# Patient Record
Sex: MALE | Race: WHITE | NOT HISPANIC OR LATINO | Employment: FULL TIME | URBAN - NONMETROPOLITAN AREA
[De-identification: names, ages, dates, MRNs, and addresses within clinical notes are randomized per-mention and may not be internally consistent; named-entity substitution may affect disease eponyms.]

---

## 2017-01-14 DIAGNOSIS — R12 HEARTBURN: ICD-10-CM

## 2017-01-16 RX ORDER — PANTOPRAZOLE SODIUM 40 MG/1
TABLET, DELAYED RELEASE ORAL
Qty: 30 TABLET | Refills: 0 | OUTPATIENT
Start: 2017-01-16

## 2018-01-29 ENCOUNTER — TRANSCRIBE ORDERS (OUTPATIENT)
Dept: ULTRASOUND IMAGING | Facility: HOSPITAL | Age: 48
End: 2018-01-29

## 2018-01-29 DIAGNOSIS — E01.0 THYROMEGALY: Primary | ICD-10-CM

## 2021-01-27 PROCEDURE — U0004 COV-19 TEST NON-CDC HGH THRU: HCPCS | Performed by: PERSONAL EMERGENCY RESPONSE ATTENDANT

## 2021-05-20 ENCOUNTER — TRANSCRIBE ORDERS (OUTPATIENT)
Dept: GENERAL RADIOLOGY | Facility: HOSPITAL | Age: 51
End: 2021-05-20

## 2021-05-20 ENCOUNTER — HOSPITAL ENCOUNTER (OUTPATIENT)
Dept: CARDIOLOGY | Facility: HOSPITAL | Age: 51
Discharge: HOME | End: 2021-05-20

## 2021-05-20 ENCOUNTER — HOSPITAL ENCOUNTER (OUTPATIENT)
Dept: GENERAL RADIOLOGY | Facility: HOSPITAL | Age: 51
Discharge: HOME | End: 2021-05-20

## 2021-05-20 DIAGNOSIS — Z01.818 PRE-OP EXAM: ICD-10-CM

## 2021-05-20 DIAGNOSIS — Z01.818 PRE-OP EXAM: Primary | ICD-10-CM

## 2021-05-20 LAB
QT INTERVAL: 344 MS
QTC INTERVAL: 394 MS

## 2021-05-20 PROCEDURE — 71046 X-RAY EXAM CHEST 2 VIEWS: CPT

## 2021-05-20 PROCEDURE — 93005 ELECTROCARDIOGRAM TRACING: CPT | Performed by: NURSE PRACTITIONER

## 2021-05-20 PROCEDURE — 93010 ELECTROCARDIOGRAM REPORT: CPT | Performed by: INTERNAL MEDICINE

## 2021-06-25 ENCOUNTER — APPOINTMENT (OUTPATIENT)
Dept: PHARMACY | Facility: HOSPITAL | Age: 51
End: 2021-06-25

## 2021-07-23 ENCOUNTER — DISEASE STATE MANAGEMENT VISIT (OUTPATIENT)
Dept: PHARMACY | Facility: HOSPITAL | Age: 51
End: 2021-07-23

## 2021-07-23 ENCOUNTER — LAB (OUTPATIENT)
Dept: LAB | Facility: HOSPITAL | Age: 51
End: 2021-07-23

## 2021-07-23 VITALS
TEMPERATURE: 96.9 F | HEART RATE: 68 BPM | DIASTOLIC BLOOD PRESSURE: 96 MMHG | SYSTOLIC BLOOD PRESSURE: 150 MMHG | OXYGEN SATURATION: 92 %

## 2021-07-23 DIAGNOSIS — F19.91 HISTORY OF ILLICIT DRUG USE: ICD-10-CM

## 2021-07-23 DIAGNOSIS — F10.11 HISTORY OF ALCOHOL ABUSE: ICD-10-CM

## 2021-07-23 DIAGNOSIS — Z86.010 HISTORY OF COLON POLYPS: ICD-10-CM

## 2021-07-23 DIAGNOSIS — B18.2 CHRONIC HEPATITIS C WITHOUT HEPATIC COMA (HCC): ICD-10-CM

## 2021-07-23 DIAGNOSIS — K21.9 GASTROESOPHAGEAL REFLUX DISEASE, UNSPECIFIED WHETHER ESOPHAGITIS PRESENT: ICD-10-CM

## 2021-07-23 DIAGNOSIS — B18.2 CHRONIC HEPATITIS C WITHOUT HEPATIC COMA (HCC): Primary | ICD-10-CM

## 2021-07-23 LAB
ALBUMIN SERPL-MCNC: 4.4 G/DL (ref 3.5–5.2)
ALBUMIN/GLOB SERPL: 1.4 G/DL
ALP SERPL-CCNC: 93 U/L (ref 39–117)
ALT SERPL W P-5'-P-CCNC: 62 U/L (ref 1–41)
AMPHET+METHAMPHET UR QL: NEGATIVE
AMPHETAMINES UR QL: NEGATIVE
ANION GAP SERPL CALCULATED.3IONS-SCNC: 9.1 MMOL/L (ref 5–15)
AST SERPL-CCNC: 39 U/L (ref 1–40)
BARBITURATES UR QL SCN: NEGATIVE
BENZODIAZ UR QL SCN: NEGATIVE
BILIRUB SERPL-MCNC: 0.4 MG/DL (ref 0–1.2)
BUN SERPL-MCNC: 13 MG/DL (ref 6–20)
BUN/CREAT SERPL: 18.3 (ref 7–25)
BUPRENORPHINE SERPL-MCNC: POSITIVE NG/ML
CALCIUM SPEC-SCNC: 9.1 MG/DL (ref 8.6–10.5)
CANNABINOIDS SERPL QL: NEGATIVE
CHLORIDE SERPL-SCNC: 102 MMOL/L (ref 98–107)
CO2 SERPL-SCNC: 25.9 MMOL/L (ref 22–29)
COCAINE UR QL: NEGATIVE
CREAT SERPL-MCNC: 0.71 MG/DL (ref 0.76–1.27)
DEPRECATED RDW RBC AUTO: 39.7 FL (ref 37–54)
ERYTHROCYTE [DISTWIDTH] IN BLOOD BY AUTOMATED COUNT: 11.6 % (ref 12.3–15.4)
GFR SERPL CREATININE-BSD FRML MDRD: 118 ML/MIN/1.73
GLOBULIN UR ELPH-MCNC: 3.2 GM/DL
GLUCOSE SERPL-MCNC: 118 MG/DL (ref 65–99)
HBV SURFACE AG SERPL QL IA: NORMAL
HCT VFR BLD AUTO: 44.9 % (ref 37.5–51)
HGB BLD-MCNC: 15.5 G/DL (ref 13–17.7)
HIV1 P24 AG SER QL: NORMAL
HIV1+2 AB SER QL: NORMAL
INR PPP: 0.93 (ref 0.9–1.1)
MCH RBC QN AUTO: 32.6 PG (ref 26.6–33)
MCHC RBC AUTO-ENTMCNC: 34.5 G/DL (ref 31.5–35.7)
MCV RBC AUTO: 94.3 FL (ref 79–97)
METHADONE UR QL SCN: NEGATIVE
OPIATES UR QL: NEGATIVE
OXYCODONE UR QL SCN: NEGATIVE
PCP UR QL SCN: NEGATIVE
PLATELET # BLD AUTO: 248 10*3/MM3 (ref 140–450)
PMV BLD AUTO: 11.8 FL (ref 6–12)
POTASSIUM SERPL-SCNC: 4.6 MMOL/L (ref 3.5–5.2)
PROPOXYPH UR QL: NEGATIVE
PROT SERPL-MCNC: 7.6 G/DL (ref 6–8.5)
PROTHROMBIN TIME: 13 SECONDS (ref 12–15.1)
RBC # BLD AUTO: 4.76 10*6/MM3 (ref 4.14–5.8)
SODIUM SERPL-SCNC: 137 MMOL/L (ref 136–145)
TRICYCLICS UR QL SCN: NEGATIVE
WBC # BLD AUTO: 7.05 10*3/MM3 (ref 3.4–10.8)

## 2021-07-23 PROCEDURE — 86706 HEP B SURFACE ANTIBODY: CPT

## 2021-07-23 PROCEDURE — 87899 AGENT NOS ASSAY W/OPTIC: CPT

## 2021-07-23 PROCEDURE — 80053 COMPREHEN METABOLIC PANEL: CPT

## 2021-07-23 PROCEDURE — 87902 NFCT AGT GNTYP ALYS HEP C: CPT

## 2021-07-23 PROCEDURE — G0432 EIA HIV-1/HIV-2 SCREEN: HCPCS

## 2021-07-23 PROCEDURE — 86708 HEPATITIS A ANTIBODY: CPT

## 2021-07-23 PROCEDURE — 99244 OFF/OP CNSLTJ NEW/EST MOD 40: CPT | Performed by: PHYSICIAN ASSISTANT

## 2021-07-23 PROCEDURE — 87522 HEPATITIS C REVRS TRNSCRPJ: CPT

## 2021-07-23 PROCEDURE — 85610 PROTHROMBIN TIME: CPT

## 2021-07-23 PROCEDURE — 86704 HEP B CORE ANTIBODY TOTAL: CPT

## 2021-07-23 PROCEDURE — 36415 COLL VENOUS BLD VENIPUNCTURE: CPT

## 2021-07-23 PROCEDURE — 80306 DRUG TEST PRSMV INSTRMNT: CPT

## 2021-07-23 PROCEDURE — 81596 NFCT DS CHRNC HCV 6 ASSAYS: CPT

## 2021-07-23 PROCEDURE — 85027 COMPLETE CBC AUTOMATED: CPT

## 2021-07-23 PROCEDURE — 87340 HEPATITIS B SURFACE AG IA: CPT

## 2021-07-23 RX ORDER — OMEPRAZOLE 40 MG/1
40 CAPSULE, DELAYED RELEASE ORAL DAILY
COMMUNITY

## 2021-07-23 RX ORDER — ATORVASTATIN CALCIUM 40 MG/1
40 TABLET, FILM COATED ORAL DAILY
COMMUNITY

## 2021-07-23 RX ORDER — GABAPENTIN 600 MG/1
600 TABLET ORAL 3 TIMES DAILY
COMMUNITY

## 2021-07-23 RX ORDER — IBUPROFEN 800 MG/1
800 TABLET ORAL EVERY 8 HOURS PRN
COMMUNITY

## 2021-07-23 RX ORDER — METOPROLOL SUCCINATE 25 MG/1
12.5 TABLET, EXTENDED RELEASE ORAL DAILY
COMMUNITY

## 2021-07-23 RX ORDER — TIZANIDINE 4 MG/1
4 TABLET ORAL 2 TIMES DAILY PRN
COMMUNITY
End: 2021-12-21

## 2021-07-23 NOTE — PROGRESS NOTES
New Patient Consult      Date: 2021   Patient Name: Dusty Erickson  MRN: [id number]  : 1970     Primary Care Provider: Genie Loera APRN  Referring Provider: Evaristo    Chief Complaint   Patient presents with   • Hepatitis C     Pt here for initial Hep C visit     History of Present Illness: Dusty Erickson is a 50 y.o. male who is here today for consultation with Gastroenterology for evaluation of Hepatitis C.    He found out about having Hepatitis C infection approx 12 years ago. He has not had prior treatment for hepatitis. He was evaluated by  approx 6 years ago and was told his insurance would not cover it. Reports no known personal history of liver disease including other viral hepatitis. There is no known family history of liver disease or cirrhosis. His father was an alcoholic. He admits to previous IVDU and intranasal drug use. He does have nonprofessional tattoos. Has history of incarceration. Admits to having previous alcoholism, drank daily for approx 25 years. He stopped drinking about 5 years ago. He does not currently drink alcohol. He denies current illicit drug use including marijuana. No recent liver imaging. He has had recent labs with PCP. He has not had previous vaccinations for Hepatitis A and B, he thinks he started but did not finish these in the past. He is currently disabled with his back, planning for surgery soon.    He has had previous colonoscopy. There is no known family history of colon cancer or colon polyps. In  he had colonoscopy and had 1 hyperplastic colon polyp removed. He takes omeprazole 40 mg once daily and still has heartburn at times.     Subjective      Past Medical History:   Diagnosis Date   • GERD (gastroesophageal reflux disease)    • Hypertension      Past Surgical History:   Procedure Laterality Date   • CERVICAL FUSION       Family History   Problem Relation Age of Onset   • Irregular heart beat Mother    • Alcohol  abuse Father      Social History     Socioeconomic History   • Marital status: Single     Spouse name: Not on file   • Number of children: Not on file   • Years of education: Not on file   • Highest education level: Not on file   Tobacco Use   • Smoking status: Current Every Day Smoker     Packs/day: 0.75     Types: Cigarettes   • Smokeless tobacco: Never Used   Vaping Use   • Vaping Use: Some days   • Substances: Nicotine   • Devices: Disposable, Pre-filled or refillable cartridge   Substance and Sexual Activity   • Alcohol use: Not Currently   • Drug use: Not Currently   • Sexual activity: Defer       Current Outpatient Medications:   •  atorvastatin (LIPITOR) 40 MG tablet, Take 40 mg by mouth Daily., Disp: , Rfl:   •  gabapentin (NEURONTIN) 600 MG tablet, Take 600 mg by mouth 3 (Three) Times a Day., Disp: , Rfl:   •  ibuprofen (ADVIL,MOTRIN) 800 MG tablet, Take 800 mg by mouth Every 8 (Eight) Hours As Needed for Mild Pain ., Disp: , Rfl:   •  metoprolol succinate XL (TOPROL-XL) 25 MG 24 hr tablet, Take 12.5 mg by mouth Daily., Disp: , Rfl:   •  omeprazole (priLOSEC) 40 MG capsule, Take 40 mg by mouth Daily., Disp: , Rfl:   •  tiZANidine (ZANAFLEX) 4 MG tablet, Take 4 mg by mouth 2 (Two) Times a Day As Needed for Muscle Spasms., Disp: , Rfl:   •  clindamycin (CLEOCIN) 300 MG capsule, Take 1 capsule by mouth 3 (Three) Times a Day., Disp: 15 capsule, Rfl: 0  •  Diclofenac Sodium (VOLTAREN) 1 % gel gel, Apply 4 g topically to the appropriate area as directed 4 (Four) Times a Day As Needed (musculoskeletal pain)., Disp: 100 g, Rfl: 0  •  mupirocin (BACTROBAN) 2 % nasal ointment, into the nostril(s) as directed by provider 2 (Two) Times a Day., Disp: 1 g, Rfl: 0  •  sulfamethoxazole-trimethoprim (BACTRIM DS,SEPTRA DS) 800-160 MG per tablet, Take 1 tablet by mouth 2 (Two) Times a Day., Disp: 20 tablet, Rfl: 0    No Known Allergies    The following portions of the patient's history were reviewed and updated as  appropriate: allergies, current medications, past family history, past medical history, past social history, past surgical history and problem list.    Objective     Physical Exam  Vitals reviewed.   Constitutional:       General: He is in acute distress.      Appearance: Normal appearance. He is well-developed. He is not ill-appearing or diaphoretic.      Comments: Stands throughout visit   HENT:      Head: Normocephalic and atraumatic.      Right Ear: External ear normal.      Left Ear: External ear normal.      Nose: Nose normal.      Mouth/Throat:      Comments: Wearing a mask  Eyes:      General: No scleral icterus.        Right eye: No discharge.         Left eye: No discharge.      Conjunctiva/sclera: Conjunctivae normal.   Neck:      Vascular: No JVD.   Cardiovascular:      Rate and Rhythm: Normal rate and regular rhythm.      Heart sounds: Normal heart sounds. No murmur heard.   No friction rub. No gallop.    Pulmonary:      Effort: No respiratory distress.      Breath sounds: Wheezing present. No rales.      Comments: Mild increased WOB  Chest:      Chest wall: No tenderness.   Abdominal:      General: Bowel sounds are normal. There is no distension.      Palpations: Abdomen is soft. There is no mass.      Tenderness: There is no abdominal tenderness. There is no guarding.   Musculoskeletal:         General: No deformity.      Cervical back: Normal range of motion.      Comments: Pain with ROM spine and difficulty getting on exam table   Skin:     General: Skin is warm and dry.      Findings: No erythema or rash.      Comments: tattoos   Neurological:      Mental Status: He is alert and oriented to person, place, and time.      Coordination: Coordination normal.   Psychiatric:         Mood and Affect: Mood normal.         Behavior: Behavior normal.         Thought Content: Thought content normal.         Judgment: Judgment normal.       Vitals:    07/23/21 0900   BP: 150/96   Patient Position: Standing    Pulse: 68   Temp: 96.9 °F (36.1 °C)   SpO2: 92%     Results Review:   I have reviewed the patient's new clinical and imaging results.     XR Chest PA & Lateral  Result Date: 5/20/2021  No acute cardiopulmonary process.       Labs labs 4/27/2021: Creatinine 0.67, , albumin 4.7, bilirubin 0.6, alkaline phosphatase 75, AST 31, ALT 48, total cholesterol 217, triglycerides 106, LDL cholesterol 153, TSH 1.7, hemoglobin 15.3, hematocrit 47.0, white blood count 6.6, .8, platelets 285,000.    Assessment / Plan      1. Chronic hepatitis C without hepatic coma (CMS/HCC)  07/23/21  He has chronic hepatitis C infection, genotype unknown, treatment naive, without suspected cirrhosis. He will need labs for further evaluation of chronic hepatitis C infection including CBC, CMP, HCV RNA quant, genotype, HIV screening, hepatitis B screening, labs to determine immunity to Hepatitis A and B, INR.    More recommendations will be made after these results have been reviewed. He will continue to abstain from alcohol and illegal drugs. He will have US liver to determine if any lesions or other liver diseases present. Immunity to Hep A and B will be determined and vaccinations recommended if needed. After review of these results and discussion with with the patient, we will proceed with Hep C therapy and will submit Rx to insurance company for approval. Treatment will depend on fibrosis score and Hep C genotype. When approved, he will  Rx from our pharmacy and start taking exactly as directed. Hep C viral load lab (HCV RNA quant) and CMP will be checked 4 weeks after start of therapy, at end of therapy and 3 months s/p therapy to determine response to treatment and cure. He will call with concerns.     - CBC (No Diff); Future  - Comprehensive Metabolic Panel; Future  - HCV FibroSURE; Future  - HCV RNA By PCR, Qn Rfx Elma; Future  - Hepatitis A Antibody, Total; Future  - Hepatitis B Core Antibody, Total; Future  -  Hepatitis B Surface Antibody; Future  - Hepatitis B Surface Antigen; Future  - HIV-1 / O / 2 Ag / Antibody 4th Generation; Future  - Protime-INR; Future  - US Liver; Future    2. History of illicit drug use  07/23/21  He understands that even after treatment of Hepatitis C, he is not immune to contract hepatitis C again if he continues any risky behaviors. He has been clean from any illicit drug use for several years now. UDS will be completed today.  - Urine Drug Screen - Urine, Clean Catch; Future    3. History of alcohol abuse  07/23/21  He drank alcohol daily in large amounts for 25 years, stopped drinking approx 5 years ago. He may have advanced fibrosis with combination of alcoholism and Hep C history.     4. Gastroesophageal reflux disease, unspecified whether esophagitis present  07/23/21  He is taking omeprazole 40 mg once daily now without complete control of GERD complaints. He does admit that he is not adherent to a GERD diet. He was instructed not to lie down immediately after eating (wait at least 3 hours after meals), elevate the head of the bed at night, avoid spicy foods, avoid mints, avoid caffeine, avoid nicotine and work on getting to a healthy weight. Continue daily PPI for now. May need EGD in the future if severe symptoms persist.     5. History of colon polyps  07/23/21  His last colonoscopy was in 2015 by Dr. Schilling and he had 1 small hyperplastic colon polyp removed. Next colonoscopy for surveillance recommended in 10 years, due in 2025.             Follow Up:   Return in about 2 weeks (around 8/6/2021) for discussion of results. He is having back surgery soon, so he may follow up later.      Taryn Vitale PA-C  [facility]  7/23/2021  16:29 EDT    Please note that portions of this note may have been completed with a voice recognition program. Efforts were made to edit the dictations, but occasionally words are mistranscribed.

## 2021-07-24 LAB
HAV AB SER QL IA: POSITIVE
HBV CORE AB SERPL QL IA: POSITIVE

## 2021-07-26 LAB — HBV SURFACE AB SER RIA-ACNC: REACTIVE

## 2021-07-27 LAB
A2 MACROGLOB SERPL-MCNC: 444 MG/DL (ref 110–276)
ALT SERPL W P-5'-P-CCNC: 73 IU/L (ref 0–55)
APO A-I SERPL-MCNC: 147 MG/DL (ref 101–178)
BILIRUB SERPL-MCNC: 0.4 MG/DL (ref 0–1.2)
FIBROSIS SCORING:: ABNORMAL
FIBROSIS STAGE SERPL QL: ABNORMAL
GGT SERPL-CCNC: 93 IU/L (ref 0–65)
HAPTOGLOB SERPL-MCNC: 116 MG/DL (ref 23–355)
HCV AB SER QL: ABNORMAL
LABORATORY COMMENT REPORT: ABNORMAL
LIVER FIBR SCORE SERPL CALC.FIBROSURE: 0.66 (ref 0–0.21)
NECROINFLAMM ACTIVITY SCORING:: ABNORMAL
NECROINFLAMMATORY ACT GRADE SERPL QL: ABNORMAL
NECROINFLAMMATORY ACT SCORE SERPL: 0.57 (ref 0–0.17)
SERVICE CMNT-IMP: ABNORMAL

## 2021-07-28 LAB
HCV GENTYP SERPL NAA+PROBE: NORMAL
HCV GENTYP SERPL NAA+PROBE: NORMAL
HCV RNA SERPL NAA+PROBE-ACNC: NORMAL IU/ML
HCV RNA SERPL NAA+PROBE-LOG IU: 6.42 LOG10 IU/ML
LABORATORY COMMENT REPORT: NORMAL
REF LAB TEST REF RANGE: NORMAL

## 2021-08-10 ENCOUNTER — HOSPITAL ENCOUNTER (OUTPATIENT)
Dept: ULTRASOUND IMAGING | Facility: HOSPITAL | Age: 51
Discharge: HOME | End: 2021-08-10
Admitting: PHYSICIAN ASSISTANT

## 2021-08-10 DIAGNOSIS — B18.2 CHRONIC HEPATITIS C WITHOUT HEPATIC COMA (HCC): ICD-10-CM

## 2021-08-10 PROCEDURE — 76705 ECHO EXAM OF ABDOMEN: CPT

## 2021-08-20 ENCOUNTER — DISEASE STATE MANAGEMENT VISIT (OUTPATIENT)
Dept: PHARMACY | Facility: HOSPITAL | Age: 51
End: 2021-08-20

## 2021-08-20 ENCOUNTER — TELEPHONE (OUTPATIENT)
Dept: GASTROENTEROLOGY | Facility: CLINIC | Age: 51
End: 2021-08-20

## 2021-08-20 VITALS
OXYGEN SATURATION: 97 % | SYSTOLIC BLOOD PRESSURE: 121 MMHG | DIASTOLIC BLOOD PRESSURE: 80 MMHG | TEMPERATURE: 97.3 F | HEART RATE: 72 BPM

## 2021-08-20 DIAGNOSIS — B18.2 CHRONIC HEPATITIS C WITHOUT HEPATIC COMA (HCC): Primary | ICD-10-CM

## 2021-08-20 DIAGNOSIS — F19.91 HISTORY OF ILLICIT DRUG USE: ICD-10-CM

## 2021-08-20 DIAGNOSIS — K70.0 ALCOHOLIC FATTY LIVER: ICD-10-CM

## 2021-08-20 PROCEDURE — 99214 OFFICE O/P EST MOD 30 MIN: CPT | Performed by: PHYSICIAN ASSISTANT

## 2021-08-20 RX ORDER — BUPRENORPHINE HYDROCHLORIDE AND NALOXONE HYDROCHLORIDE DIHYDRATE 8; 2 MG/1; MG/1
2 TABLET SUBLINGUAL DAILY
COMMUNITY
Start: 2021-07-28

## 2021-08-20 RX ORDER — OXYCODONE HYDROCHLORIDE AND ACETAMINOPHEN 5; 325 MG/1; MG/1
1 TABLET ORAL EVERY 4 HOURS PRN
COMMUNITY
Start: 2021-08-19 | End: 2021-12-21

## 2021-08-20 RX ORDER — GLECAPREVIR AND PIBRENTASVIR 40; 100 MG/1; MG/1
3 TABLET, FILM COATED ORAL DAILY
Qty: 84 TABLET | Refills: 1
Start: 2021-08-20 | End: 2021-08-26

## 2021-08-20 NOTE — PROGRESS NOTES
Follow Up Note     Date: 2021   Patient Name: Dusty Erickson  MRN: [id number]  : 1970     Primary Care Provider: Genie Loera APRN     Chief Complaint   Patient presents with   • Hepatitis C     Pt here for 2 week follow up on treatment     History of present illness:   2021  Dusty Erickson is a 51 y.o. male who is here today for follow up regarding Hepatitis C.    He completed labs and ultrasound as directed last visit and would like to discuss those results. He had back surgery 3 weeks ago and reports recovering well. He does have recent new diagnosis of elevated cholesterol. He would like to be considered for hepatitis C treatment.     Interval History:  2021  He found out about having Hepatitis C infection approx 12 years ago. He has not had prior treatment for hepatitis. He was evaluated by  approx 6 years ago and was told his insurance would not cover it. Reports no known personal history of liver disease including other viral hepatitis. There is no known family history of liver disease or cirrhosis. His father was an alcoholic. He admits to previous IVDU and intranasal drug use. He does have nonprofessional tattoos. Has history of incarceration. Admits to having previous alcoholism, drank daily for approx 25 years. He stopped drinking about 5 years ago. He does not currently drink alcohol. He denies current illicit drug use including marijuana. No recent liver imaging. He has had recent labs with PCP. He has not had previous vaccinations for Hepatitis A and B, he thinks he started but did not finish these in the past. He is currently disabled with his back, planning for surgery soon.     He has had previous colonoscopy. There is no known family history of colon cancer or colon polyps. In  he had colonoscopy and had 1 hyperplastic colon polyp removed. He takes omeprazole 40 mg once daily and still has heartburn at times.     Subjective     Past Medical  History:   Diagnosis Date   • GERD (gastroesophageal reflux disease)    • Hypertension      Past Surgical History:   Procedure Laterality Date   • CERVICAL FUSION     • LUMBAR FUSION  08/04/2021     Family History   Problem Relation Age of Onset   • Irregular heart beat Mother    • Alcohol abuse Father      Social History     Socioeconomic History   • Marital status: Single     Spouse name: Not on file   • Number of children: Not on file   • Years of education: Not on file   • Highest education level: Not on file   Tobacco Use   • Smoking status: Current Every Day Smoker     Types: Cigarettes   • Smokeless tobacco: Never Used   • Tobacco comment: 3 per day   Vaping Use   • Vaping Use: Former   • Substances: Nicotine   • Devices: Disposable, Pre-filled or refillable cartridge   Substance and Sexual Activity   • Alcohol use: Not Currently   • Drug use: Not Currently   • Sexual activity: Defer     Current Outpatient Medications:   •  atorvastatin (LIPITOR) 40 MG tablet, Take 40 mg by mouth Daily., Disp: , Rfl:   •  Diclofenac Sodium (VOLTAREN) 1 % gel gel, Apply 4 g topically to the appropriate area as directed 4 (Four) Times a Day As Needed (musculoskeletal pain)., Disp: 100 g, Rfl: 0  •  gabapentin (NEURONTIN) 600 MG tablet, Take 600 mg by mouth 3 (Three) Times a Day., Disp: , Rfl:   •  ibuprofen (ADVIL,MOTRIN) 800 MG tablet, Take 800 mg by mouth Every 8 (Eight) Hours As Needed for Mild Pain ., Disp: , Rfl:   •  metoprolol succinate XL (TOPROL-XL) 25 MG 24 hr tablet, Take 12.5 mg by mouth Daily., Disp: , Rfl:   •  omeprazole (priLOSEC) 40 MG capsule, Take 40 mg by mouth Daily., Disp: , Rfl:   •  oxyCODONE-acetaminophen (PERCOCET) 5-325 MG per tablet, Take 1 tablet by mouth Every 4 (Four) Hours As Needed., Disp: , Rfl:   •  tiZANidine (ZANAFLEX) 4 MG tablet, Take 4 mg by mouth 2 (Two) Times a Day As Needed for Muscle Spasms., Disp: , Rfl:   •  buprenorphine-naloxone (SUBOXONE) 8-2 MG per SL tablet, Place 2 tablets  under the tongue Daily., Disp: , Rfl:     No Known Allergies    The following portions of the patient's history were reviewed and updated as appropriate: allergies, current medications, past family history, past medical history, past social history, past surgical history and problem list.    Objective     Physical Exam  Constitutional:       General: He is not in acute distress.     Appearance: Normal appearance. He is well-developed. He is not diaphoretic.   HENT:      Head: Normocephalic and atraumatic.      Right Ear: External ear normal.      Left Ear: External ear normal.      Nose: Nose normal.      Mouth/Throat:      Comments: Wearing a mask  Eyes:      General: No scleral icterus.        Right eye: No discharge.         Left eye: No discharge.      Conjunctiva/sclera: Conjunctivae normal.   Neck:      Trachea: No tracheal deviation.   Pulmonary:      Effort: Pulmonary effort is normal. No respiratory distress.   Musculoskeletal:         General: Normal range of motion.      Cervical back: Normal range of motion.   Skin:     Coloration: Skin is not pale.      Findings: No erythema or rash.   Neurological:      Mental Status: He is alert and oriented to person, place, and time.      Coordination: Coordination normal.   Psychiatric:         Mood and Affect: Mood normal.         Behavior: Behavior normal.         Thought Content: Thought content normal.         Judgment: Judgment normal.       Vitals:    08/20/21 0900   BP: 121/80   Pulse: 72   Temp: 97.3 °F (36.3 °C)   SpO2: 97%       Results Review:   I reviewed the patient's new clinical results.    Lab on 07/23/2021   Component Date Value Ref Range Status   • Protime 07/23/2021 13.0  12.0 - 15.1 Seconds Final   • INR 07/23/2021 0.93  0.90 - 1.10 Final   • WBC 07/23/2021 7.05  3.40 - 10.80 10*3/mm3 Final   • RBC 07/23/2021 4.76  4.14 - 5.80 10*6/mm3 Final   • Hemoglobin 07/23/2021 15.5  13.0 - 17.7 g/dL Final   • Hematocrit 07/23/2021 44.9  37.5 - 51.0 %  Final   • MCV 07/23/2021 94.3  79.0 - 97.0 fL Final   • MCH 07/23/2021 32.6  26.6 - 33.0 pg Final   • MCHC 07/23/2021 34.5  31.5 - 35.7 g/dL Final   • RDW 07/23/2021 11.6* 12.3 - 15.4 % Final   • RDW-SD 07/23/2021 39.7  37.0 - 54.0 fl Final   • MPV 07/23/2021 11.8  6.0 - 12.0 fL Final   • Platelets 07/23/2021 248  140 - 450 10*3/mm3 Final   • Glucose 07/23/2021 118* 65 - 99 mg/dL Final   • BUN 07/23/2021 13  6 - 20 mg/dL Final   • Creatinine 07/23/2021 0.71* 0.76 - 1.27 mg/dL Final   • Sodium 07/23/2021 137  136 - 145 mmol/L Final   • Potassium 07/23/2021 4.6  3.5 - 5.2 mmol/L Final   • Chloride 07/23/2021 102  98 - 107 mmol/L Final   • CO2 07/23/2021 25.9  22.0 - 29.0 mmol/L Final   • Calcium 07/23/2021 9.1  8.6 - 10.5 mg/dL Final   • Total Protein 07/23/2021 7.6  6.0 - 8.5 g/dL Final   • Albumin 07/23/2021 4.40  3.50 - 5.20 g/dL Final   • ALT (SGPT) 07/23/2021 62* 1 - 41 U/L Final   • AST (SGOT) 07/23/2021 39  1 - 40 U/L Final   • Alkaline Phosphatase 07/23/2021 93  39 - 117 U/L Final   • Total Bilirubin 07/23/2021 0.4  0.0 - 1.2 mg/dL Final   • eGFR Non  Amer 07/23/2021 118  >60 mL/min/1.73 Final   • Globulin 07/23/2021 3.2  gm/dL Final   • A/G Ratio 07/23/2021 1.4  g/dL Final   • BUN/Creatinine Ratio 07/23/2021 18.3  7.0 - 25.0 Final   • Anion Gap 07/23/2021 9.1  5.0 - 15.0 mmol/L Final   • Fibrosis Score 07/23/2021 0.66* 0.00 - 0.21 Final   • Fibrosis Stage 07/23/2021 Comment   Final     F3-Bridging fibrosis with many septa   • Necroinflammat Activity Score 07/23/2021 0.57* 0.00 - 0.17 Final   • Necroinflammat Activity Grade 07/23/2021 A2-Moderate activity   Final   • Alpha 2-Macroglobulins, Qn 07/23/2021 444* 110 - 276 mg/dL Final   • Haptoglobin 07/23/2021 116  23 - 355 mg/dL Final   • Apolipoprotein A-1 07/23/2021 147  101 - 178 mg/dL Final   • Total Bilirubin 07/23/2021 0.4  0.0 - 1.2 mg/dL Final   • GGT 07/23/2021 93* 0 - 65 IU/L Final   • ALT (SGPT) 07/23/2021 73* 0 - 55 IU/L Final   • Hepatitis C  Quantitation 07/23/2021 [id number]  IU/mL Final   • HCV log10 07/23/2021 6.415  log10 IU/mL Final   • Hep A Total Ab 07/23/2021 Positive* Negative Final   • Hep B Core Total Ab 07/23/2021 Positive* Negative Final   • Hep B S Ab 07/23/2021 Reactive* Non-Reactive Final   • Hepatitis B Surface Ag 07/23/2021 Non-Reactive  Non-Reactive Final   • HIV-1/ HIV-2 Ab 07/23/2021 Non-Reactive  Non-Reactive Final   • HIV-1 P24 Ag Screen 07/23/2021 Non-Reactive  Non-Reactive Final   • THC, Screen, Urine 07/23/2021 Negative  Negative Final   • Phencyclidine (PCP), Urine 07/23/2021 Negative  Negative Final   • Cocaine Screen, Urine 07/23/2021 Negative  Negative Final   • Methamphetamine, Ur 07/23/2021 Negative  Negative Final   • Opiate Screen 07/23/2021 Negative  Negative Final   • Amphetamine Screen, Urine 07/23/2021 Negative  Negative Final   • Benzodiazepine Screen, Urine 07/23/2021 Negative  Negative Final   • Tricyclic Antidepressants Screen 07/23/2021 Negative  Negative Final   • Methadone Screen, Urine 07/23/2021 Negative  Negative Final   • Barbiturates Screen, Urine 07/23/2021 Negative  Negative Final   • Oxycodone Screen, Urine 07/23/2021 Negative  Negative Final   • Propoxyphene Screen 07/23/2021 Negative  Negative Final   • Buprenorphine, Screen, Urine 07/23/2021 Positive* Negative Final   • Hepatitis C Genotype 07/23/2021 1a   Final      US Liver  Result Date: 8/10/2021  Fatty infiltration of the liver.      Assessment / Plan      1. Chronic hepatitis C without hepatic coma   08/20/21  He has chronic hepatitis C infection, genotype 1a, treatment naïve, without cirrhosis. I have perscribed Mavyret for 8 weeks for Hepatitis C therapy. He will take this medication at the same time every day without missing any doses. We had a discussion on treatment course, possible medication adverse reactions and follow-up during the treatment and after treatment. Hep C viral load lab (HCV RNA quant) and CMP will be checked 4 weeks after  start of therapy, at end of therapy and 3 months s/p therapy to determine response to treatment and cure. He will continue to abstain from alcohol use at this time and agrees not to use illegal drugs. He understands to avoid any high risk behavior to prevent any reinfection during treatment and after treatment.    Rx- Glecaprevir-Pibrentasvir (Mavyret) 100-40 MG tablet, Take 3 tablets by mouth Daily for 8 weeks. Disp: 84 tablet, Rfl: 1    CTP class A. No cirrhosis, fibrosis score calculated at F3, Genotype 1a.   He is immune to hepatitis A and B and no vaccinations are needed.   Source of infection is likely his previous IV drug use.  Liver imaging completed recently and showed fatty infection of the liver, no focal lesions.  HIV test is negative. No history of liver transplant.   Follow-up in 4 weeks time for discussion regarding medication adherence and will have labs after next visit.    7/23/2021  He has chronic hepatitis C infection, genotype unknown, treatment naive, without suspected cirrhosis. He will need labs for further evaluation of chronic hepatitis C infection including CBC, CMP, HCV RNA quant, genotype, HIV screening, hepatitis B screening, labs to determine immunity to Hepatitis A and B, INR. More recommendations will be made after these results have been reviewed. He will continue to abstain from alcohol and illegal drugs. He will have US liver to determine if any lesions or other liver diseases present. Immunity to Hep A and B will be determined and vaccinations recommended if needed. After review of these results and discussion with with the patient, we will proceed with Hep C therapy and will submit Rx to insurance company for approval. Treatment will depend on fibrosis score and Hep C genotype. When approved, he will  Rx from our pharmacy and start taking exactly as directed. Hep C viral load lab (HCV RNA quant) and CMP will be checked 4 weeks after start of therapy, at end of therapy and  3 months s/p therapy to determine response to treatment and cure. He will call with concerns.     2. Alcoholic fatty liver  08/20/21  Is a history of alcoholism, stopped drinking approximately 5 years ago but drank for more than 25 years daily prior to that.  He also has a new diagnosis of high cholesterol which is being treated by his PCP.  Mediterranean diet has been suggested and information given.  Last labs show ALT increased at 62 but normal total bilirubin.  Fibrosis stage was calculated at F3 on fibroSURE.  He will need long-term monitoring of his fatty liver disease.    3. History of illicit drug use  08/20/21  Recent urine drug screen showed Suboxone only which is prescribed.  He has not been using any illicit drugs or drinking alcohol.    07/23/21  He understands that even after treatment of Hepatitis C, he is not immune to contract hepatitis C again if he continues any risky behaviors. He has been clean from any illicit drug use for several years now. UDS will be completed today.         Prior history:  History of alcohol abuse  07/23/21  He drank alcohol daily in large amounts for 25 years, stopped drinking approx 5 years ago. He may have advanced fibrosis with combination of alcoholism and Hep C history.      Gastroesophageal reflux disease, unspecified whether esophagitis present  07/23/21  He is taking omeprazole 40 mg once daily now without complete control of GERD complaints. He does admit that he is not adherent to a GERD diet. He was instructed not to lie down immediately after eating (wait at least 3 hours after meals), elevate the head of the bed at night, avoid spicy foods, avoid mints, avoid caffeine, avoid nicotine and work on getting to a healthy weight. Continue daily PPI for now. May need EGD in the future if severe symptoms persist.      History of colon polyps  07/23/21  His last colonoscopy was in 2015 by Dr. Schilling and he had 1 small hyperplastic colon polyp removed. Next colonoscopy  for surveillance recommended in 10 years, due in 2025.       Follow Up:   Return in about 1 month (around 9/20/2021) for recheck Hepatitis C.      Taryn Vitale PA-C  [facility]  8/20/2021  12:19 EDT    Please note that portions of this note may have been completed with a voice recognition program. Efforts were made to edit the dictations, but occasionally words are mistranscribed.

## 2021-08-20 NOTE — TELEPHONE ENCOUNTER
He has chronic Hepatitis C infection, genotype 1a, treatment naive, without cirrhosis (F3). I have prescribed Mavyret 3 tabs PO once daily for 8 weeks for treatment.

## 2021-08-20 NOTE — PATIENT INSTRUCTIONS
Mediterranean Diet  A Mediterranean diet refers to food and lifestyle choices that are based on the traditions of countries located on the [place]. This way of eating has been shown to help prevent certain conditions and improve outcomes for people who have chronic diseases, like kidney disease and heart disease.  What are tips for following this plan?  Lifestyle  · Cook and eat meals together with your family, when possible.  · Drink enough fluid to keep your urine clear or pale yellow.  · Be physically active every day. This includes:  ? Aerobic exercise like running or swimming.  ? Leisure activities like gardening, walking, or housework.  · Get 7-8 hours of sleep each night.  · If recommended by your health care provider, drink red wine in moderation. This means 1 glass a day for nonpregnant women and 2 glasses a day for men. A glass of wine equals 5 oz (150 mL).  Reading food labels    · Check the serving size of packaged foods. For foods such as rice and pasta, the serving size refers to the amount of cooked product, not dry.  · Check the total fat in packaged foods. Avoid foods that have saturated fat or trans fats.  · Check the ingredients list for added sugars, such as corn syrup.  Shopping  · At the grocery store, buy most of your food from the areas near the walls of the store. This includes:  ? Fresh fruits and vegetables (produce).  ? Grains, beans, nuts, and seeds. Some of these may be available in unpackaged forms or large amounts (in bulk).  ? Fresh seafood.  ? Poultry and eggs.  ? Low-fat dairy products.  · Buy whole ingredients instead of prepackaged foods.  · Buy fresh fruits and vegetables in-season from local farmers markets.  · Buy frozen fruits and vegetables in resealable bags.  · If you do not have access to quality fresh seafood, buy precooked frozen shrimp or canned fish, such as tuna, salmon, or sardines.  · Buy small amounts of raw or cooked vegetables, salads, or olives from  the deli or salad bar at your store.  · Stock your pantry so you always have certain foods on hand, such as olive oil, canned tuna, canned tomatoes, rice, pasta, and beans.  Cooking  · Cook foods with extra-virgin olive oil instead of using butter or other vegetable oils.  · Have meat as a side dish, and have vegetables or grains as your main dish. This means having meat in small portions or adding small amounts of meat to foods like pasta or stew.  · Use beans or vegetables instead of meat in common dishes like chili or lasagna.  · [place] with different cooking methods. Try roasting or broiling vegetables instead of steaming or sautéeing them.  · Add frozen vegetables to soups, stews, pasta, or rice.  · Add nuts or seeds for added healthy fat at each meal. You can add these to yogurt, salads, or vegetable dishes.  · Marinate fish or vegetables using olive oil, lemon juice, garlic, and fresh herbs.  Meal planning    · Plan to eat 1 vegetarian meal one day each week. Try to work up to 2 vegetarian meals, if possible.  · Eat seafood 2 or more times a week.  · Have healthy snacks readily available, such as:  ? Vegetable sticks with hummus.  ? Greek yogurt.  ? Fruit and nut trail mix.  · Eat balanced meals throughout the week. This includes:  ? Fruit: 2-3 servings a day  ? Vegetables: 4-5 servings a day  ? Low-fat dairy: 2 servings a day  ? Fish, poultry, or lean meat: 1 serving a day  ? Beans and legumes: 2 or more servings a week  ? Nuts and seeds: 1-2 servings a day  ? Whole grains: 6-8 servings a day  ? Extra-virgin olive oil: 3-4 servings a day  · Limit red meat and sweets to only a few servings a month  What are my food choices?  · Mediterranean diet  ? Recommended  § Grains: Whole-grain pasta. Brown rice. Bulgar wheat. Polenta. Couscous. Whole-wheat bread. Oatmeal. Quinoa.  § Vegetables: Artichokes. Beets. Broccoli. Cabbage. Carrots. Eggplant. Green beans. Chard. Kale. Spinach. Onions. Leeks. Peas. Squash.  Tomatoes. Peppers. Radishes.  § Fruits: Apples. Apricots. Avocado. Berries. Bananas. Cherries. Dates. Figs. Grapes. [place]. Melon. Oranges. Peaches. Plums. Pomegranate.  § Meats and other protein foods: Beans. Almonds. Sunflower seeds. Pine nuts. Peanuts. Cod. [place]. Scallops. Shrimp. Tuna. Tilapia. Clams. Oysters. Eggs.  § Dairy: Low-fat milk. Cheese. Greek yogurt.  § Beverages: Water. Red wine. Herbal tea.  § Fats and oils: Extra virgin olive oil. Avocado oil. Grape seed oil.  § Sweets and desserts: Greek yogurt with honey. Baked apples. Poached pears. Trail mix.  § Seasoning and other foods: Basil. Cilantro. Coriander. Cumin. Mint. Parsley. [place]. Rosemary. Tarragon. Garlic. Oregano. Thyme. Pepper. Balsalmic vinegar. Tahini. Hummus. Tomato sauce. Olives. Mushrooms.  ? Limit these  § Grains: Prepackaged pasta or rice dishes. Prepackaged cereal with added sugar.  § Vegetables: Deep fried potatoes (french fries).  § Fruits: Fruit canned in syrup.  § Meats and other protein foods: Beef. Pork. Lamb. Poultry with skin. Hot dogs. [place].  § Dairy: Ice cream. Sour cream. Whole milk.  § Beverages: Juice. Sugar-sweetened soft drinks. Beer. Liquor and spirits.  § Fats and oils: Butter. Canola oil. Vegetable oil. Beef fat (tallow). Lard.  § Sweets and desserts: Cookies. Cakes. Pies. Candy.  § Seasoning and other foods: Mayonnaise. Premade sauces and marinades.  The items listed may not be a complete list. Talk with your dietitian about what dietary choices are right for you.  Summary  · The Mediterranean diet includes both food and lifestyle choices.  · Eat a variety of fresh fruits and vegetables, beans, nuts, seeds, and whole grains.  · Limit the amount of red meat and sweets that you eat.  · Talk with your health care provider about whether it is safe for you to drink red wine in moderation. This means 1 glass a day for nonpregnant women and 2 glasses a day for men. A glass of wine equals 5 oz (150 mL).  This information  is not intended to replace advice given to you by your health care provider. Make sure you discuss any questions you have with your health care provider.  Document Revised: 08/17/2017 Document Reviewed: 08/10/2017  Elsevier Patient Education © 2020 [employer]

## 2021-08-26 ENCOUNTER — DISEASE STATE MANAGEMENT VISIT (OUTPATIENT)
Dept: PHARMACY | Facility: HOSPITAL | Age: 51
End: 2021-08-26

## 2021-08-26 RX ORDER — GLECAPREVIR AND PIBRENTASVIR 40; 100 MG/1; MG/1
3 TABLET, FILM COATED ORAL DAILY
Qty: 84 TABLET | Refills: 1 | Status: SHIPPED | OUTPATIENT
Start: 2021-08-26 | End: 2021-12-21

## 2021-08-26 NOTE — PROGRESS NOTES
Dusty Erickson is a 51 y.o. male [facility] for [facility] C [facility].      Past Medical History:   Diagnosis Date   • GERD (gastroesophageal reflux disease)    • Hypertension      Social History     Socioeconomic History   • Marital status: Single     Spouse name: Not on file   • Number of children: Not on file   • Years of education: Not on file   • Highest education level: Not on file   Tobacco Use   • Smoking status: Current Every Day Smoker     Types: Cigarettes   • Smokeless tobacco: Never Used   • Tobacco comment: 3 per day   Vaping Use   • Vaping Use: Former   • Substances: Nicotine   • Devices: Disposable, Pre-filled or refillable cartridge   Substance and Sexual Activity   • Alcohol use: Not Currently   • Drug use: Not Currently   • Sexual activity: Defer     Patient has no known allergies.    Current Outpatient Medications:   •  atorvastatin (LIPITOR) 40 MG tablet, Take 40 mg by mouth Daily., Disp: , Rfl:   •  buprenorphine-naloxone (SUBOXONE) 8-2 MG per SL tablet, Place 2 tablets under the tongue Daily., Disp: , Rfl:   •  Diclofenac Sodium (VOLTAREN) 1 % gel gel, Apply 4 g topically to the appropriate area as directed 4 (Four) Times a Day As Needed (musculoskeletal pain)., Disp: 100 g, Rfl: 0  •  gabapentin (NEURONTIN) 600 MG tablet, Take 600 mg by mouth 3 (Three) Times a Day., Disp: , Rfl:   •  Glecaprevir-Pibrentasvir (Mavyret) 100-40 MG tablet, Take 3 tablets by mouth Daily. Take all 3 tablets PO once daily with food, Disp: 84 tablet, Rfl: 1  •  ibuprofen (ADVIL,MOTRIN) 800 MG tablet, Take 800 mg by mouth Every 8 (Eight) Hours As Needed for Mild Pain ., Disp: , Rfl:   •  metoprolol succinate XL (TOPROL-XL) 25 MG 24 hr tablet, Take 12.5 mg by mouth Daily., Disp: , Rfl:   •  omeprazole (priLOSEC) 40 MG capsule, Take 40 mg by mouth Daily., Disp: , Rfl:   •  oxyCODONE-acetaminophen (PERCOCET) 5-325 MG per tablet, Take 1 tablet by mouth Every 4 (Four) Hours As  Needed., Disp: , Rfl:   •  tiZANidine (ZANAFLEX) 4 MG tablet, Take 4 mg by mouth 2 (Two) Times a Day As Needed for Muscle Spasms., Disp: , Rfl:     Labs     WBC   Date Value Ref Range Status   07/23/2021 7.05 3.40 - 10.80 10*3/mm3 Final     RBC   Date Value Ref Range Status   07/23/2021 4.76 4.14 - 5.80 10*6/mm3 Final     Hemoglobin   Date Value Ref Range Status   07/23/2021 15.5 13.0 - 17.7 g/dL Final     Hematocrit   Date Value Ref Range Status   07/23/2021 44.9 37.5 - 51.0 % Final     MCV   Date Value Ref Range Status   07/23/2021 94.3 79.0 - 97.0 fL Final     MCH   Date Value Ref Range Status   07/23/2021 32.6 26.6 - 33.0 pg Final     MCHC   Date Value Ref Range Status   07/23/2021 34.5 31.5 - 35.7 g/dL Final     RDW   Date Value Ref Range Status   07/23/2021 11.6 (L) 12.3 - 15.4 % Final     RDW-SD   Date Value Ref Range Status   07/23/2021 39.7 37.0 - 54.0 fl Final     MPV   Date Value Ref Range Status   07/23/2021 11.8 6.0 - 12.0 fL Final     Platelets   Date Value Ref Range Status   07/23/2021 248 140 - 450 10*3/mm3 Final     Neutrophil Rel %   Date Value Ref Range Status   11/04/2016 81.3 (H) 37.0 - 80.0 % Final     Lymphocyte Rel %   Date Value Ref Range Status   11/04/2016 10.9 10.0 - 50.0 % Final     Eosinophil Rel %   Date Value Ref Range Status   11/04/2016 1.9 0.0 - 7.0 % Final     Basophil Rel %   Date Value Ref Range Status   11/04/2016 0.30 0.00 - 2.50 % Final     Neutrophils Absolute   Date Value Ref Range Status   11/04/2016 14.14 (H) 2.00 - 6.90 THOUS Final     Lymphocytes Absolute   Date Value Ref Range Status   11/04/2016 1.89 0.60 - 3.40 THOUS Final     Monocytes Absolute   Date Value Ref Range Status   11/04/2016 0.87 0.00 - 0.90 THOUS Final     Eosinophils Absolute   Date Value Ref Range Status   11/04/2016 0.33 0.00 - 0.70 THOUS Final     Basophils Absolute   Date Value Ref Range Status   11/04/2016 0.06 0.00 - 0.20 THOUS Final       Lab Results   Component Value Date    WBC 7.05 07/23/2021     HGB 15.5 07/23/2021    HCT 44.9 07/23/2021    MCV 94.3 07/23/2021     07/23/2021     Lab Results   Component Value Date    HAV Positive (A) 07/23/2021    HEPAIGM Negative 09/09/2014    HEPBCAB Positive (A) 07/23/2021       No results found for: HCVRNA   Hepatitis C Genotype   Date Value Ref Range Status   07/23/2021 1a  Final     HCV Genotype   Date Value Ref Range Status   07/23/2021 Comment  Final     Comment:     To be performed on this specimen.       Drug Interactions Screening     A drug-drug interactions check was made. One interaction is expected according to literature. Mavyret should not be taken with atorvastatin (Lipitor).   Genie Loera's office has been contacted about this interaction. They have prescribed Crestor 5 mg to replace Lipitor during therapy with Mavyret.      Assessment & Plan     Patient has Hepatitis C, genotype 1a, withouth cirrhosis (F3) and is treatment naïve.  Patient has been prescribed Mavyret 3 tablets daily with food x 8 weeks.  Will begin prior authorization, if applicable and provide medication counseling to patient once drug is approved and ready to dispense.      Pt will follow up with clinic 4 weeks after starting medication to assess virologic response and medication tolerability.     Donna Kyle RPH  08/26/21  17:01 EDT

## 2021-08-27 ENCOUNTER — PRIOR AUTHORIZATION (OUTPATIENT)
Dept: PHARMACY | Facility: HOSPITAL | Age: 51
End: 2021-08-27

## 2021-08-30 ENCOUNTER — TELEPHONE (OUTPATIENT)
Dept: PHARMACY | Facility: HOSPITAL | Age: 51
End: 2021-08-30

## 2021-08-30 NOTE — TELEPHONE ENCOUNTER
Patient was counseled on new medication Mavyret (glecaprevir and pibrentasvir)     The patient was counseled on the following:     - Take 3 tablets at the same time each day, with food.   - This medication is used to treat hepatitis C infection.   - Frequently reported side effects of this drug include: headache, loss of energy, nausea and diarrhea.   - Talk to your doctor right away if you notice dark urine, fatigue, lack of appetite, nausea, abdominal pain, light-colored stools, vomiting or yellow skin.   - Go to the ED with signs of a significant reaction including wheezing; chest tightness; fever; itching; bad cough; blue skin color; seizures; or swelling of face, lips, tongue or     throat.   - Be sure to follow up with our clinic 4 weeks after starting the medication to ensure you are tolerating the medication well and that you are responding appropriately to the medication.   - Make sure to tell your doctor or pharmacist about all medications you are taking, including herbal supplements and OTC products.    - Do not stop taking this medication without talking to your provider.   - It is very important that you do not miss a dose of this medication.  Use a pill planner, medication adherence shanda or other tool to help you remember how to take your medication.    - If you do miss a dose and it is within 18 hours from the usual dosage time, administer dose as soon as possible, then administer dose at usual dosage time.  If more than 18 hours from the usual dosage time has passed, skip the missed dose and administer the next dose at the usual time.       Patient Specific Counseling Points:       - Patients with diabetes should closely monitor their blood sugar after starting. This medication may lead to an improvement in glucose metabolism, potentially resulting in hypoglycemia.  Monitor for signs and symptoms of hypoglycemia and follow the rule of 15 to treat hypoglycemia.   -Pt counseled to replace atorvastatin  with rosuvastatin 5 mg during treatment with Mavyret.     We will schedule a 4 week follow-up visit after patient picks up.    Shannon LopezD.

## 2021-10-01 ENCOUNTER — DISEASE STATE MANAGEMENT VISIT (OUTPATIENT)
Dept: PHARMACY | Facility: HOSPITAL | Age: 51
End: 2021-10-01

## 2021-10-01 ENCOUNTER — LAB (OUTPATIENT)
Dept: LAB | Facility: HOSPITAL | Age: 51
End: 2021-10-01

## 2021-10-01 VITALS
HEART RATE: 73 BPM | OXYGEN SATURATION: 95 % | DIASTOLIC BLOOD PRESSURE: 76 MMHG | SYSTOLIC BLOOD PRESSURE: 126 MMHG | TEMPERATURE: 98.1 F

## 2021-10-01 DIAGNOSIS — B18.2 CHRONIC HEPATITIS C WITHOUT HEPATIC COMA (HCC): ICD-10-CM

## 2021-10-01 DIAGNOSIS — B18.2 CHRONIC HEPATITIS C WITHOUT HEPATIC COMA (HCC): Primary | ICD-10-CM

## 2021-10-01 LAB
ALBUMIN SERPL-MCNC: 4.3 G/DL (ref 3.5–5.2)
ALBUMIN/GLOB SERPL: 1.2 G/DL
ALP SERPL-CCNC: 136 U/L (ref 39–117)
ALT SERPL W P-5'-P-CCNC: 22 U/L (ref 1–41)
ANION GAP SERPL CALCULATED.3IONS-SCNC: 6.8 MMOL/L (ref 5–15)
AST SERPL-CCNC: 32 U/L (ref 1–40)
BILIRUB SERPL-MCNC: 0.5 MG/DL (ref 0–1.2)
BUN SERPL-MCNC: 12 MG/DL (ref 6–20)
BUN/CREAT SERPL: 19.4 (ref 7–25)
CALCIUM SPEC-SCNC: 9.3 MG/DL (ref 8.6–10.5)
CHLORIDE SERPL-SCNC: 104 MMOL/L (ref 98–107)
CO2 SERPL-SCNC: 27.2 MMOL/L (ref 22–29)
CREAT SERPL-MCNC: 0.62 MG/DL (ref 0.76–1.27)
GFR SERPL CREATININE-BSD FRML MDRD: 138 ML/MIN/1.73
GLOBULIN UR ELPH-MCNC: 3.6 GM/DL
GLUCOSE SERPL-MCNC: 94 MG/DL (ref 65–99)
POTASSIUM SERPL-SCNC: 4.5 MMOL/L (ref 3.5–5.2)
PROT SERPL-MCNC: 7.9 G/DL (ref 6–8.5)
SODIUM SERPL-SCNC: 138 MMOL/L (ref 136–145)

## 2021-10-01 PROCEDURE — 99213 OFFICE O/P EST LOW 20 MIN: CPT | Performed by: PHYSICIAN ASSISTANT

## 2021-10-01 PROCEDURE — 80053 COMPREHEN METABOLIC PANEL: CPT

## 2021-10-01 PROCEDURE — 36415 COLL VENOUS BLD VENIPUNCTURE: CPT

## 2021-10-01 PROCEDURE — 87522 HEPATITIS C REVRS TRNSCRPJ: CPT

## 2021-10-01 NOTE — PROGRESS NOTES
Follow Up Note     Date: 10/01/2021   Patient Name: Dusty Erickson  MRN: [id number]  : 1970     Primary Care Provider: Genie Loera APRN     Chief Complaint   Patient presents with   • Hepatitis C     Pt here for 4 week follow up on treatment     History of present illness:   10/8/2021  Dusty Erickson is a 51 y.o. male who is here today for follow up regarding Hepatitis C.    He has been taking Mavyret 3 tabs PO once daily for the past 4 weeks. He missed 1 dose of this medication so far. He has noticed increased thirst and feeling overheated with the medication, better now. Denies nausea, fatigue or headache.  Denies any current illicit drug use.    Interval History:  2021  He completed labs and ultrasound as directed last visit and would like to discuss those results. He had back surgery 3 weeks ago and reports recovering well. He does have recent new diagnosis of elevated cholesterol. He would like to be considered for hepatitis C treatment.      2021  He found out about having Hepatitis C infection approx 12 years ago. He has not had prior treatment for hepatitis. He was evaluated by  approx 6 years ago and was told his insurance would not cover it. Reports no known personal history of liver disease including other viral hepatitis. There is no known family history of liver disease or cirrhosis. His father was an alcoholic. He admits to previous IVDU and intranasal drug use. He does have nonprofessional tattoos. Has history of incarceration. Admits to having previous alcoholism, drank daily for approx 25 years. He stopped drinking about 5 years ago. He does not currently drink alcohol. He denies current illicit drug use including marijuana. No recent liver imaging. He has had recent labs with PCP. He has not had previous vaccinations for Hepatitis A and B, he thinks he started but did not finish these in the past. He is currently disabled with his back, planning for  surgery soon.     He has had previous colonoscopy. There is no known family history of colon cancer or colon polyps. In 2015 he had colonoscopy and had 1 hyperplastic colon polyp removed. He takes omeprazole 40 mg once daily and still has heartburn at times.     Subjective     Past Medical History:   Diagnosis Date   • GERD (gastroesophageal reflux disease)    • Hypertension      Past Surgical History:   Procedure Laterality Date   • CERVICAL FUSION     • LUMBAR FUSION  08/04/2021     Family History   Problem Relation Age of Onset   • Irregular heart beat Mother    • Alcohol abuse Father      Social History     Socioeconomic History   • Marital status: Single     Spouse name: Not on file   • Number of children: Not on file   • Years of education: Not on file   • Highest education level: Not on file   Tobacco Use   • Smoking status: Current Every Day Smoker     Packs/day: 0.50     Types: Cigarettes   • Smokeless tobacco: Never Used   • Tobacco comment: 3 per day   Vaping Use   • Vaping Use: Some days   • Substances: Nicotine   • Devices: Disposable, Pre-filled or refillable cartridge   Substance and Sexual Activity   • Alcohol use: Not Currently   • Drug use: Not Currently   • Sexual activity: Defer     Current Outpatient Medications:   •  buprenorphine-naloxone (SUBOXONE) 8-2 MG per SL tablet, Place 2 tablets under the tongue Daily., Disp: , Rfl:   •  gabapentin (NEURONTIN) 600 MG tablet, Take 600 mg by mouth 3 (Three) Times a Day., Disp: , Rfl:   •  Glecaprevir-Pibrentasvir (Mavyret) 100-40 MG tablet, Take 3 tablets by mouth Daily with food. Take all 3 tablets together., Disp: 84 tablet, Rfl: 1  •  ibuprofen (ADVIL,MOTRIN) 800 MG tablet, Take 800 mg by mouth Every 8 (Eight) Hours As Needed for Mild Pain ., Disp: , Rfl:   •  metoprolol succinate XL (TOPROL-XL) 25 MG 24 hr tablet, Take 12.5 mg by mouth Daily., Disp: , Rfl:   •  omeprazole (priLOSEC) 40 MG capsule, Take 40 mg by mouth Daily., Disp: , Rfl:   •   rosuvastatin (CRESTOR) 5 MG tablet, Take 1 tablet by mouth Every Evening. Replaces atorvastatin, Disp: 30 tablet, Rfl: 2  •  tiZANidine (ZANAFLEX) 4 MG tablet, Take 4 mg by mouth 2 (Two) Times a Day As Needed for Muscle Spasms., Disp: , Rfl:   •  atorvastatin (LIPITOR) 40 MG tablet, Take 40 mg by mouth Daily., Disp: , Rfl:   •  Diclofenac Sodium (VOLTAREN) 1 % gel gel, Apply 4 g topically to the appropriate area as directed 4 (Four) Times a Day As Needed (musculoskeletal pain)., Disp: 100 g, Rfl: 0  •  oxyCODONE-acetaminophen (PERCOCET) 5-325 MG per tablet, Take 1 tablet by mouth Every 4 (Four) Hours As Needed., Disp: , Rfl:     No Known Allergies    The following portions of the patient's history were reviewed and updated as appropriate: allergies, current medications, past family history, past medical history, past social history, past surgical history and problem list.    Objective     Physical Exam  Vitals reviewed.   Constitutional:       General: He is not in acute distress.     Appearance: Normal appearance. He is well-developed. He is not ill-appearing or diaphoretic.   HENT:      Head: Normocephalic and atraumatic.      Right Ear: External ear normal.      Left Ear: External ear normal.      Nose: Nose normal.      Mouth/Throat:      Comments: Wearing a mask  Eyes:      General: No scleral icterus.        Right eye: No discharge.         Left eye: No discharge.      Conjunctiva/sclera: Conjunctivae normal.   Neck:      Vascular: No JVD.   Cardiovascular:      Rate and Rhythm: Normal rate and regular rhythm.      Heart sounds: Normal heart sounds. No murmur heard.   No friction rub. No gallop.    Pulmonary:      Effort: Pulmonary effort is normal. No respiratory distress.      Breath sounds: Normal breath sounds. No wheezing or rales.   Chest:      Chest wall: No tenderness.   Abdominal:      General: Bowel sounds are normal. There is no distension.      Palpations: Abdomen is soft. There is no mass.       Tenderness: There is no abdominal tenderness. There is no guarding.   Musculoskeletal:         General: No deformity. Normal range of motion.      Cervical back: Normal range of motion.   Skin:     General: Skin is warm and dry.      Findings: No erythema or rash.   Neurological:      Mental Status: He is alert and oriented to person, place, and time.      Coordination: Coordination normal.   Psychiatric:         Mood and Affect: Mood normal.         Behavior: Behavior normal.         Thought Content: Thought content normal.         Judgment: Judgment normal.       Vitals:    10/01/21 1100   BP: 126/76   Pulse: 73   Temp: 98.1 °F (36.7 °C)   SpO2: 95%       Results Review:   I reviewed the patient's new clinical results.    Lab on 07/23/2021   Component Date Value Ref Range Status   • Protime 07/23/2021 13.0  12.0 - 15.1 Seconds Final   • INR 07/23/2021 0.93  0.90 - 1.10 Final   • WBC 07/23/2021 7.05  3.40 - 10.80 10*3/mm3 Final   • RBC 07/23/2021 4.76  4.14 - 5.80 10*6/mm3 Final   • Hemoglobin 07/23/2021 15.5  13.0 - 17.7 g/dL Final   • Hematocrit 07/23/2021 44.9  37.5 - 51.0 % Final   • MCV 07/23/2021 94.3  79.0 - 97.0 fL Final   • MCH 07/23/2021 32.6  26.6 - 33.0 pg Final   • MCHC 07/23/2021 34.5  31.5 - 35.7 g/dL Final   • RDW 07/23/2021 11.6* 12.3 - 15.4 % Final   • RDW-SD 07/23/2021 39.7  37.0 - 54.0 fl Final   • MPV 07/23/2021 11.8  6.0 - 12.0 fL Final   • Platelets 07/23/2021 248  140 - 450 10*3/mm3 Final   • Glucose 07/23/2021 118* 65 - 99 mg/dL Final   • BUN 07/23/2021 13  6 - 20 mg/dL Final   • Creatinine 07/23/2021 0.71* 0.76 - 1.27 mg/dL Final   • Sodium 07/23/2021 137  136 - 145 mmol/L Final   • Potassium 07/23/2021 4.6  3.5 - 5.2 mmol/L Final   • Chloride 07/23/2021 102  98 - 107 mmol/L Final   • CO2 07/23/2021 25.9  22.0 - 29.0 mmol/L Final   • Calcium 07/23/2021 9.1  8.6 - 10.5 mg/dL Final   • Total Protein 07/23/2021 7.6  6.0 - 8.5 g/dL Final   • Albumin 07/23/2021 4.40  3.50 - 5.20 g/dL Final   •  ALT (SGPT) 07/23/2021 62* 1 - 41 U/L Final   • AST (SGOT) 07/23/2021 39  1 - 40 U/L Final   • Alkaline Phosphatase 07/23/2021 93  39 - 117 U/L Final   • Total Bilirubin 07/23/2021 0.4  0.0 - 1.2 mg/dL Final   • eGFR Non  Amer 07/23/2021 118  >60 mL/min/1.73 Final   • Globulin 07/23/2021 3.2  gm/dL Final   • A/G Ratio 07/23/2021 1.4  g/dL Final   • BUN/Creatinine Ratio 07/23/2021 18.3  7.0 - 25.0 Final   • Anion Gap 07/23/2021 9.1  5.0 - 15.0 mmol/L Final   • Fibrosis Score 07/23/2021 0.66* 0.00 - 0.21 Final   • Fibrosis Stage 07/23/2021 Comment    Final      F3-Bridging fibrosis with many septa   • Necroinflammat Activity Score 07/23/2021 0.57* 0.00 - 0.17 Final   • Necroinflammat Activity Grade 07/23/2021 A2-Moderate activity    Final   • Alpha 2-Macroglobulins, Qn 07/23/2021 444* 110 - 276 mg/dL Final   • Haptoglobin 07/23/2021 116  23 - 355 mg/dL Final   • Apolipoprotein A-1 07/23/2021 147  101 - 178 mg/dL Final   • Total Bilirubin 07/23/2021 0.4  0.0 - 1.2 mg/dL Final   • GGT 07/23/2021 93* 0 - 65 IU/L Final   • ALT (SGPT) 07/23/2021 73* 0 - 55 IU/L Final   • Hepatitis C Quantitation 07/23/2021 [id number]  IU/mL Final   • HCV log10 07/23/2021 6.415  log10 IU/mL Final   • Hep A Total Ab 07/23/2021 Positive* Negative Final   • Hep B Core Total Ab 07/23/2021 Positive* Negative Final   • Hep B S Ab 07/23/2021 Reactive* Non-Reactive Final   • Hepatitis B Surface Ag 07/23/2021 Non-Reactive  Non-Reactive Final   • HIV-1/ HIV-2 Ab 07/23/2021 Non-Reactive  Non-Reactive Final   • HIV-1 P24 Ag Screen 07/23/2021 Non-Reactive  Non-Reactive Final   • THC, Screen, Urine 07/23/2021 Negative  Negative Final   • Phencyclidine (PCP), Urine 07/23/2021 Negative  Negative Final   • Cocaine Screen, Urine 07/23/2021 Negative  Negative Final   • Methamphetamine, Ur 07/23/2021 Negative  Negative Final   • Opiate Screen 07/23/2021 Negative  Negative Final   • Amphetamine Screen, Urine 07/23/2021 Negative  Negative Final   •  Benzodiazepine Screen, Urine 07/23/2021 Negative  Negative Final   • Tricyclic Antidepressants Screen 07/23/2021 Negative  Negative Final   • Methadone Screen, Urine 07/23/2021 Negative  Negative Final   • Barbiturates Screen, Urine 07/23/2021 Negative  Negative Final   • Oxycodone Screen, Urine 07/23/2021 Negative  Negative Final   • Propoxyphene Screen 07/23/2021 Negative  Negative Final   • Buprenorphine, Screen, Urine 07/23/2021 Positive* Negative Final   • Hepatitis C Genotype 07/23/2021 1a    Final      US Liver  Result Date: 8/10/2021  Fatty infiltration of the liver.       Assessment / Plan      1. Chronic hepatitis C without hepatic coma  10/1/2021  He has been taking Mavyret 3 tabs p.o. once daily for the past 4 weeks for treatment of chronic hepatitis C infection.  Continue this medication as prescribed.  He will have labs today for monitoring of response to treatment including HCVRNA and CMP.  Continue to abstain from alcohol and illicit drug use.  Labs to be repeated again at completion of therapy in 3 months status post completion of therapy for confirmation of cure.  - Hepatitis C RNA, Quantitative, PCR (graph); Future  - Comprehensive Metabolic Panel; Future    08/20/21  He has chronic hepatitis C infection, genotype 1a, treatment naïve, without cirrhosis. I have perscribed Mavyret for 8 weeks for Hepatitis C therapy. He will take this medication at the same time every day without missing any doses. We had a discussion on treatment course, possible medication adverse reactions and follow-up during the treatment and after treatment. Hep C viral load lab (HCV RNA quant) and CMP will be checked 4 weeks after start of therapy, at end of therapy and 3 months s/p therapy to determine response to treatment and cure. He will continue to abstain from alcohol use at this time and agrees not to use illegal drugs. He understands to avoid any high risk behavior to prevent any reinfection during treatment and  after treatment.  Rx- Glecaprevir-Pibrentasvir (Mavyret) 100-40 MG tablet, Take 3 tablets by mouth Daily for 8 weeks. Disp: 84 tablet, Rfl: 1  CTP class A. No cirrhosis, fibrosis score calculated at F3, Genotype 1a.   He is immune to hepatitis A and B and no vaccinations are needed.   Source of infection is likely his previous IV drug use.  Liver imaging completed recently and showed fatty infection of the liver, no focal lesions.  HIV test is negative. No history of liver transplant.   Follow-up in 4 weeks time for discussion regarding medication adherence and will have labs after next visit.     7/23/2021  He has chronic hepatitis C infection, genotype unknown, treatment naive, without suspected cirrhosis. He will need labs for further evaluation of chronic hepatitis C infection including CBC, CMP, HCV RNA quant, genotype, HIV screening, hepatitis B screening, labs to determine immunity to Hepatitis A and B, INR. More recommendations will be made after these results have been reviewed. He will continue to abstain from alcohol and illegal drugs. He will have US liver to determine if any lesions or other liver diseases present. Immunity to Hep A and B will be determined and vaccinations recommended if needed. After review of these results and discussion with with the patient, we will proceed with Hep C therapy and will submit Rx to insurance company for approval. Treatment will depend on fibrosis score and Hep C genotype. When approved, he will  Rx from our pharmacy and start taking exactly as directed. Hep C viral load lab (HCV RNA quant) and CMP will be checked 4 weeks after start of therapy, at end of therapy and 3 months s/p therapy to determine response to treatment and cure. He will call with concerns.              Prior history:  History of alcohol abuse  07/23/21  He drank alcohol daily in large amounts for 25 years, stopped drinking approx 5 years ago. He may have advanced fibrosis with combination  of alcoholism and Hep C history.      Gastroesophageal reflux disease, unspecified whether esophagitis present  07/23/21  He is taking omeprazole 40 mg once daily now without complete control of GERD complaints. He does admit that he is not adherent to a GERD diet. He was instructed not to lie down immediately after eating (wait at least 3 hours after meals), elevate the head of the bed at night, avoid spicy foods, avoid mints, avoid caffeine, avoid nicotine and work on getting to a healthy weight. Continue daily PPI for now. May need EGD in the future if severe symptoms persist.      History of colon polyps  07/23/21  His last colonoscopy was in 2015 by Dr. Schilling and he had 1 small hyperplastic colon polyp removed. Next colonoscopy for surveillance recommended in 10 years, due in 2025.      Alcoholic fatty liver  08/20/21  Has a history of alcoholism, stopped drinking approximately 5 years ago but drank for more than 25 years daily prior to that.  He also has a new diagnosis of high cholesterol which is being treated by his PCP.  Mediterranean diet has been suggested and information given.  Last labs show ALT increased at 62 but normal total bilirubin.  Fibrosis stage was calculated at F3 on fibroSURE.  He will need long-term monitoring of his fatty liver disease.     History of illicit drug use  08/20/21  Recent urine drug screen showed Suboxone only which is prescribed.  He has not been using any illicit drugs or drinking alcohol.     07/23/21  He understands that even after treatment of Hepatitis C, he is not immune to contract hepatitis C again if he continues any risky behaviors. He has been clean from any illicit drug use for several years now. UDS will be completed today.           Follow Up:   Follow-up after final labs have been completed for recheck hep C.      Taryn Vitale PA-C  [facility] [place]  10/8/2021  13:50 EDT    Please note that portions of this note may have been completed with a voice  recognition program. Efforts were made to edit the dictations, but occasionally words are mistranscribed.

## 2021-10-01 NOTE — PATIENT INSTRUCTIONS
MISSED A DOSE?  If it’s less than 18 hours  Take the missed dose with food as soon as you can. Then take your next dose at the usual time    If it’s more than 18 hours  Do not take your missed dose. Take your next dose as usual, with food

## 2021-10-04 LAB
HCV RNA SERPL NAA+PROBE-ACNC: NORMAL IU/ML
TEST INFORMATION: NORMAL

## 2021-10-08 ENCOUNTER — TELEPHONE (OUTPATIENT)
Dept: GASTROENTEROLOGY | Facility: CLINIC | Age: 51
End: 2021-10-08

## 2021-10-08 DIAGNOSIS — B18.2 CHRONIC HEPATITIS C WITHOUT HEPATIC COMA (HCC): Primary | ICD-10-CM

## 2021-10-08 NOTE — TELEPHONE ENCOUNTER
Labs at 4 weeks into treatment with Mavyret show HCV not detected. Please let him know he is responding to treatment. Have labs again at completion of treatment.

## 2021-10-14 NOTE — TELEPHONE ENCOUNTER
Pt called back, lab results were given, pt was happy about the results. I informed him that he will need to have labs again once he completes treatment.     Pt verbalized understanding. I will add to my calendar for a reminder call about the labs.

## 2021-10-26 ENCOUNTER — LAB (OUTPATIENT)
Dept: LAB | Facility: HOSPITAL | Age: 51
End: 2021-10-26

## 2021-10-26 DIAGNOSIS — B18.2 CHRONIC HEPATITIS C WITHOUT HEPATIC COMA (HCC): ICD-10-CM

## 2021-10-26 LAB
ALBUMIN SERPL-MCNC: 4.7 G/DL (ref 3.5–5.2)
ALBUMIN/GLOB SERPL: 1.5 G/DL
ALP SERPL-CCNC: 126 U/L (ref 39–117)
ALT SERPL W P-5'-P-CCNC: 17 U/L (ref 1–41)
ANION GAP SERPL CALCULATED.3IONS-SCNC: 7.9 MMOL/L (ref 5–15)
AST SERPL-CCNC: 20 U/L (ref 1–40)
BILIRUB SERPL-MCNC: 0.3 MG/DL (ref 0–1.2)
BUN SERPL-MCNC: 14 MG/DL (ref 6–20)
BUN/CREAT SERPL: 19.4 (ref 7–25)
CALCIUM SPEC-SCNC: 10 MG/DL (ref 8.6–10.5)
CHLORIDE SERPL-SCNC: 103 MMOL/L (ref 98–107)
CO2 SERPL-SCNC: 28.1 MMOL/L (ref 22–29)
CREAT SERPL-MCNC: 0.72 MG/DL (ref 0.76–1.27)
GFR SERPL CREATININE-BSD FRML MDRD: 116 ML/MIN/1.73
GLOBULIN UR ELPH-MCNC: 3.2 GM/DL
GLUCOSE SERPL-MCNC: 115 MG/DL (ref 65–99)
POTASSIUM SERPL-SCNC: 4.6 MMOL/L (ref 3.5–5.2)
PROT SERPL-MCNC: 7.9 G/DL (ref 6–8.5)
SODIUM SERPL-SCNC: 139 MMOL/L (ref 136–145)

## 2021-10-26 PROCEDURE — 87522 HEPATITIS C REVRS TRNSCRPJ: CPT

## 2021-10-26 PROCEDURE — 80053 COMPREHEN METABOLIC PANEL: CPT

## 2021-10-26 PROCEDURE — 36415 COLL VENOUS BLD VENIPUNCTURE: CPT

## 2021-10-28 ENCOUNTER — TELEPHONE (OUTPATIENT)
Dept: GASTROENTEROLOGY | Facility: CLINIC | Age: 51
End: 2021-10-28

## 2021-10-28 LAB
HCV RNA SERPL NAA+PROBE-ACNC: NORMAL IU/ML
TEST INFORMATION: NORMAL

## 2021-11-03 ENCOUNTER — SPECIALTY PHARMACY (OUTPATIENT)
Dept: PHARMACY | Facility: HOSPITAL | Age: 51
End: 2021-11-03

## 2021-11-08 NOTE — TELEPHONE ENCOUNTER
Spoke with patient and discussed lab results, pt is please with his results. I informed pt that he will need to have labs again 12 weeks from completion. Pt verbalized understanding. Pt will be due 1-17-22 for labs

## 2021-12-21 ENCOUNTER — OFFICE VISIT (OUTPATIENT)
Dept: NEUROLOGY | Facility: CLINIC | Age: 51
End: 2021-12-21

## 2021-12-21 VITALS
DIASTOLIC BLOOD PRESSURE: 72 MMHG | HEART RATE: 65 BPM | BODY MASS INDEX: 29.93 KG/M2 | SYSTOLIC BLOOD PRESSURE: 120 MMHG | OXYGEN SATURATION: 97 % | HEIGHT: 72 IN | WEIGHT: 221 LBS | TEMPERATURE: 97.7 F

## 2021-12-21 DIAGNOSIS — M79.601 PARESTHESIA AND PAIN OF BOTH UPPER EXTREMITIES: ICD-10-CM

## 2021-12-21 DIAGNOSIS — G43.119 BASILAR ARTERY MIGRAINE, INTRACTABLE: Primary | ICD-10-CM

## 2021-12-21 DIAGNOSIS — M79.602 PARESTHESIA AND PAIN OF BOTH UPPER EXTREMITIES: ICD-10-CM

## 2021-12-21 DIAGNOSIS — R29.90 STROKE-LIKE SYMPTOMS: ICD-10-CM

## 2021-12-21 DIAGNOSIS — R20.2 PARESTHESIA AND PAIN OF BOTH UPPER EXTREMITIES: ICD-10-CM

## 2021-12-21 PROCEDURE — 99204 OFFICE O/P NEW MOD 45 MIN: CPT | Performed by: NURSE PRACTITIONER

## 2021-12-21 RX ORDER — SUMATRIPTAN 50 MG/1
50 TABLET, FILM COATED ORAL ONCE AS NEEDED
Qty: 9 TABLET | Refills: 1 | Status: SHIPPED | OUTPATIENT
Start: 2021-12-21 | End: 2022-03-22

## 2021-12-21 RX ORDER — AMITRIPTYLINE HYDROCHLORIDE 10 MG/1
10 TABLET, FILM COATED ORAL EVERY EVENING
Qty: 90 TABLET | Refills: 1 | Status: SHIPPED | OUTPATIENT
Start: 2021-12-21 | End: 2022-03-22 | Stop reason: SINTOL

## 2021-12-21 NOTE — PATIENT INSTRUCTIONS
You will begin taking amitriptyline for headache prevention. This can also be helpful for the pain and numbness in your hands and arms. Take this in the evening about 1-2 hours before bed.     Migraine Headache  A migraine headache is an intense, throbbing pain on one side or both sides of the head. Migraine headaches may also cause other symptoms, such as nausea, vomiting, and sensitivity to light and noise. A migraine headache can last from 4 hours to 3 days. Talk with your doctor about what things may bring on (trigger) your migraine headaches.  What are the causes?  The exact cause of this condition is not known. However, a migraine may be caused when nerves in the brain become irritated and release chemicals that cause inflammation of blood vessels. This inflammation causes pain. This condition may be triggered or caused by:  · Drinking alcohol.  · Smoking.  · Taking medicines, such as:  ? Medicine used to treat chest pain (nitroglycerin).  ? Birth control pills.  ? Estrogen.  ? Certain blood pressure medicines.  · Eating or drinking products that contain nitrates, glutamate, aspartame, or tyramine. Aged cheeses, chocolate, or caffeine may also be triggers.  · Doing physical activity.  Other things that may trigger a migraine headache include:  · Menstruation.  · Pregnancy.  · Hunger.  · Stress.  · Lack of sleep or too much sleep.  · Weather changes.  · Fatigue.  What increases the risk?  The following factors may make you more likely to experience migraine headaches:  · Being a certain age. This condition is more common in people who are 25-55 years old.  · Being female.  · Having a family history of migraine headaches.  · Being .  · Having a mental health condition, such as depression or anxiety.  · Being obese.  What are the signs or symptoms?  The main symptom of this condition is pulsating or throbbing pain. This pain may:  · Happen in any area of the head, such as on one side or both  sides.  · Interfere with daily activities.  · Get worse with physical activity.  · Get worse with exposure to bright lights or loud noises.  Other symptoms may include:  · Nausea.  · Vomiting.  · Dizziness.  · General sensitivity to bright lights, loud noises, or smells.  Before you get a migraine headache, you may get warning signs (an aura). An aura may include:  · Seeing flashing lights or having blind spots.  · Seeing bright spots, halos, or zigzag lines.  · Having tunnel vision or blurred vision.  · Having numbness or a tingling feeling.  · Having trouble talking.  · Having muscle weakness.  Some people have symptoms after a migraine headache (postdromal phase), such as:  · Feeling tired.  · Difficulty concentrating.  How is this diagnosed?  A migraine headache can be diagnosed based on:  · Your symptoms.  · A physical exam.  · Tests, such as:  ? CT scan or an MRI of the head. These imaging tests can help rule out other causes of headaches.  ? Taking fluid from the spine (lumbar puncture) and analyzing it (cerebrospinal fluid analysis, or CSF analysis).  How is this treated?  This condition may be treated with medicines that:  · Relieve pain.  · Relieve nausea.  · Prevent migraine headaches.  Treatment for this condition may also include:  · Acupuncture.  · Lifestyle changes like avoiding foods that trigger migraine headaches.  · Biofeedback.  · Cognitive behavioral therapy.  Follow these instructions at home:  Medicines  · Take over-the-counter and prescription medicines only as told by your health care provider.  · Ask your health care provider if the medicine prescribed to you:  ? Requires you to avoid driving or using heavy machinery.  ? Can cause constipation. You may need to take these actions to prevent or treat constipation:  § Drink enough fluid to keep your urine pale yellow.  § Take over-the-counter or prescription medicines.  § Eat foods that are high in fiber, such as beans, whole grains, and fresh  fruits and vegetables.  § Limit foods that are high in fat and processed sugars, such as fried or sweet foods.  Lifestyle  · Do not drink alcohol.  · Do not use any products that contain nicotine or tobacco, such as cigarettes, e-cigarettes, and chewing tobacco. If you need help quitting, ask your health care provider.  · Get at least 8 hours of sleep every night.  · Find ways to manage stress, such as meditation, deep breathing, or yoga.  General instructions         · Keep a journal to find out what may trigger your migraine headaches. For example, write down:  ? What you eat and drink.  ? How much sleep you get.  ? Any change to your diet or medicines.  · If you have a migraine headache:  ? Avoid things that make your symptoms worse, such as bright lights.  ? It may help to lie down in a dark, quiet room.  ? Do not drive or use heavy machinery.  ? Ask your health care provider what activities are safe for you while you are experiencing symptoms.  · Keep all follow-up visits as told by your health care provider. This is important.  Contact a health care provider if:  · You develop symptoms that are different or more severe than your usual migraine headache symptoms.  · You have more than 15 headache days in one month.  Get help right away if:  · Your migraine headache becomes severe.  · Your migraine headache lasts longer than 72 hours.  · You have a fever.  · You have a stiff neck.  · You have vision loss.  · Your muscles feel weak or like you cannot control them.  · You start to lose your balance often.  · You have trouble walking.  · You faint.  · You have a seizure.  Summary  · A migraine headache is an intense, throbbing pain on one side or both sides of the head. Migraines may also cause other symptoms, such as nausea, vomiting, and sensitivity to light and noise.  · This condition may be treated with medicines and lifestyle changes. You may also need to avoid certain things that trigger a migraine  headache.  · Keep a journal to find out what may trigger your migraine headaches.  · Contact your health care provider if you have more than 15 headache days in a month or you develop symptoms that are different or more severe than your usual migraine headache symptoms.  This information is not intended to replace advice given to you by your health care provider. Make sure you discuss any questions you have with your health care provider.  Document Revised: 04/10/2020 Document Reviewed: 01/30/2020  Elsevier Patient Education © 2021 "[employer]  Sumatriptan tablets  What is this medicine?  SUMATRIPTAN (pernell ma TRIP tan) is used to treat migraines with or without aura. An aura is a strange feeling or visual disturbance that warns you of an attack. It is not used to prevent migraines.  This medicine may be used for other purposes; ask your health care provider or pharmacist if you have questions.  COMMON BRAND NAME(S): Imitrex, Migraine Pack  What should I tell my health care provider before I take this medicine?  They need to know if you have any of these conditions:  · cigarette smoker  · circulation problems in fingers and toes  · diabetes  · heart disease  · high blood pressure  · high cholesterol  · history of irregular heartbeat  · history of stroke  · kidney disease  · liver disease  · stomach or intestine problems  · an unusual or allergic reaction to sumatriptan, other medicines, foods, dyes, or preservatives  · pregnant or trying to get pregnant  · breast-feeding  How should I use this medicine?  Take this medicine by mouth with a glass of water. Follow the directions on the prescription label. Do not take it more often than directed.  Talk to your pediatrician regarding the use of this medicine in children. Special care may be needed.  Overdosage: If you think you have taken too much of this medicine contact a poison control center or emergency room at once.  NOTE: This medicine is only for you. Do not  share this medicine with others.  What if I miss a dose?  This does not apply. This medicine is not for regular use.  What may interact with this medicine?  Do not take this medicine with any of the following medicines:  · certain medicines for migraine headache like almotriptan, eletriptan, frovatriptan, naratriptan, rizatriptan, sumatriptan, zolmitriptan  · ergot alkaloids like dihydroergotamine, ergonovine, ergotamine, methylergonovine  · MAOIs like Carbex, Eldepryl, Marplan, Nardil, and Parnate  This medicine may also interact with the following medications:  · certain medicines for depression, anxiety, or psychotic disorders  This list may not describe all possible interactions. Give your health care provider a list of all the medicines, herbs, non-prescription drugs, or dietary supplements you use. Also tell them if you smoke, drink alcohol, or use illegal drugs. Some items may interact with your medicine.  What should I watch for while using this medicine?  Visit your healthcare professional for regular checks on your progress. Tell your healthcare professional if your symptoms do not start to get better or if they get worse.  You may get drowsy or dizzy. Do not drive, use machinery, or do anything that needs mental alertness until you know how this medicine affects you. Do not stand up or sit up quickly, especially if you are an older patient. This reduces the risk of dizzy or fainting spells. Alcohol may interfere with the effect of this medicine.  Tell your healthcare professional right away if you have any change in your eyesight.  If you take migraine medicines for 10 or more days a month, your migraines may get worse. Keep a diary of headache days and medicine use. Contact your healthcare professional if your migraine attacks occur more frequently.  What side effects may I notice from receiving this medicine?  Side effects that you should report to your doctor or health care professional as soon as  possible:  · allergic reactions like skin rash, itching or hives, swelling of the face, lips, or tongue  · changes in vision  · chest pain or chest tightness  · signs and symptoms of a dangerous change in heartbeat or heart rhythm like chest pain; dizziness; fast, irregular heartbeat; palpitations; feeling faint or lightheaded; falls; breathing problems  · signs and symptoms of a stroke like changes in vision; confusion; trouble speaking or understanding; severe headaches; sudden numbness or weakness of the face, arm or leg; trouble walking; dizziness; loss of balance or coordination  · signs and symptoms of serotonin syndrome like irritable; confusion; diarrhea; fast or irregular heartbeat; muscle twitching; stiff muscles; trouble walking; sweating; high fever; seizures; chills; vomiting  Side effects that usually do not require medical attention (report to your doctor or health care professional if they continue or are bothersome):  · diarrhea  · dizziness  · drowsiness  · dry mouth  · headache  · nausea, vomiting  · pain, tingling, numbness in the hands or feet  · stomach pain  This list may not describe all possible side effects. Call your doctor for medical advice about side effects. You may report side effects to FDA at 0-685-NTD-1159.  Where should I keep my medicine?  Keep out of the reach of children.  Store at room temperature between 2 and 30 degrees C (36 and 86 degrees F). Throw away any unused medicine after the expiration date.  NOTE: This sheet is a summary. It may not cover all possible information. If you have questions about this medicine, talk to your doctor, pharmacist, or health care provider.  © 2021 [employer] Standard (2019-07-02 15:05:37)  Dizziness  Dizziness is a common problem. It is a feeling of unsteadiness or light-headedness. You may feel like you are about to faint. Dizziness can lead to injury if you stumble or fall. Anyone can become dizzy, but dizziness is more common in older  adults. This condition can be caused by a number of things, including medicines, dehydration, or illness.  Follow these instructions at home:  Eating and drinking  · Drink enough fluid to keep your urine clear or pale yellow. This helps to keep you from becoming dehydrated. Try to drink more clear fluids, such as water.  · Do not drink alcohol.  · Limit your caffeine intake if told to do so by your health care provider. Check ingredients and nutrition facts to see if a food or beverage contains caffeine.  · Limit your salt (sodium) intake if told to do so by your health care provider. Check ingredients and nutrition facts to see if a food or beverage contains sodium.  Activity  · Avoid making quick movements.  ? Rise slowly from chairs and steady yourself until you feel okay.  ? In the morning, first sit up on the side of the bed. When you feel okay, stand slowly while you hold onto something until you know that your balance is fine.  · If you need to  one place for a long time, move your legs often. Tighten and relax the muscles in your legs while you are standing.  · Do not drive or use heavy machinery if you feel dizzy.  · Avoid bending down if you feel dizzy. Place items in your home so that they are easy for you to reach without leaning over.  Lifestyle  · Do not use any products that contain nicotine or tobacco, such as cigarettes and e-cigarettes. If you need help quitting, ask your health care provider.  · Try to reduce your stress level by using methods such as yoga or meditation. Talk with your health care provider if you need help to manage your stress.  General instructions  · Watch your dizziness for any changes.  · Take over-the-counter and prescription medicines only as told by your health care provider. Talk with your health care provider if you think that your dizziness is caused by a medicine that you are taking.  · Tell a friend or a family member that you are feeling dizzy. If he or she  notices any changes in your behavior, have this person call your health care provider.  · Keep all follow-up visits as told by your health care provider. This is important.  Contact a health care provider if:  · Your dizziness does not go away.  · Your dizziness or light-headedness gets worse.  · You feel nauseous.  · You have reduced hearing.  · You have new symptoms.  · You are unsteady on your feet or you feel like the room is spinning.  Get help right away if:  · You vomit or have diarrhea and are unable to eat or drink anything.  · You have problems talking, walking, swallowing, or using your arms, hands, or legs.  · You feel generally weak.  · You are not thinking clearly or you have trouble forming sentences. It may take a friend or family member to notice this.  · You have chest pain, abdominal pain, shortness of breath, or sweating.  · Your vision changes.  · You have any bleeding.  · You have a severe headache.  · You have neck pain or a stiff neck.  · You have a fever.  These symptoms may represent a serious problem that is an emergency. Do not wait to see if the symptoms will go away. Get medical help right away. Call your local emergency services (911 in the [place] Do not drive yourself to the hospital.  Summary  · Dizziness is a feeling of unsteadiness or light-headedness. This condition can be caused by a number of things, including medicines, dehydration, or illness.  · Anyone can become dizzy, but dizziness is more common in older adults.  · Drink enough fluid to keep your urine clear or pale yellow. Do not drink alcohol.  · Avoid making quick movements if you feel dizzy. Monitor your dizziness for any changes.  This information is not intended to replace advice given to you by your health care provider. Make sure you discuss any questions you have with your health care provider.  Document Revised: 12/21/2018 Document Reviewed: 01/20/2018  Elsevier Patient Education © 2021 [employer]
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
## 2022-01-05 ENCOUNTER — PROCEDURE VISIT (OUTPATIENT)
Dept: ORTHOPEDIC SURGERY | Facility: CLINIC | Age: 52
End: 2022-01-05

## 2022-01-05 DIAGNOSIS — R20.2 PARESTHESIA AND PAIN OF BOTH UPPER EXTREMITIES: ICD-10-CM

## 2022-01-05 DIAGNOSIS — G56.03 CARPAL TUNNEL SYNDROME, BILATERAL: Primary | ICD-10-CM

## 2022-01-05 DIAGNOSIS — M79.601 PARESTHESIA AND PAIN OF BOTH UPPER EXTREMITIES: ICD-10-CM

## 2022-01-05 DIAGNOSIS — M79.602 PARESTHESIA AND PAIN OF BOTH UPPER EXTREMITIES: ICD-10-CM

## 2022-01-05 PROCEDURE — 95911 NRV CNDJ TEST 9-10 STUDIES: CPT | Performed by: PHYSICAL THERAPIST

## 2022-01-05 PROCEDURE — 95886 MUSC TEST DONE W/N TEST COMP: CPT | Performed by: PHYSICAL THERAPIST

## 2022-01-17 ENCOUNTER — HOSPITAL ENCOUNTER (OUTPATIENT)
Dept: MRI IMAGING | Facility: HOSPITAL | Age: 52
End: 2022-01-17

## 2022-01-17 ENCOUNTER — APPOINTMENT (OUTPATIENT)
Dept: ULTRASOUND IMAGING | Facility: HOSPITAL | Age: 52
End: 2022-01-17

## 2022-01-21 ENCOUNTER — HOSPITAL ENCOUNTER (OUTPATIENT)
Dept: MRI IMAGING | Facility: HOSPITAL | Age: 52
Discharge: HOME | End: 2022-01-21
Admitting: NURSE PRACTITIONER

## 2022-01-21 DIAGNOSIS — G43.119 BASILAR ARTERY MIGRAINE, INTRACTABLE: ICD-10-CM

## 2022-01-21 DIAGNOSIS — R29.90 STROKE-LIKE SYMPTOMS: ICD-10-CM

## 2022-01-21 PROCEDURE — 70551 MRI BRAIN STEM W/O DYE: CPT

## 2022-02-01 ENCOUNTER — TELEPHONE (OUTPATIENT)
Dept: NEUROLOGY | Facility: CLINIC | Age: 52
End: 2022-02-01

## 2022-02-01 NOTE — TELEPHONE ENCOUNTER
Provider: JAY THOMAS  Caller: PATIENT  Relationship to Patient: SELF  Pharmacy: NA  Phone Number: [phone]  Reason for Call: PATIENT  STATES RETURNING CALL TO [facility]. PLEASE ADVISE.   When was the patient last seen: 12-21-21

## 2022-02-02 NOTE — TELEPHONE ENCOUNTER
ATTEMPTED TO CONTACT PATIENT. WAS JUST CALLING TO LET PATIENT KNOW HIS MRI RESULTS CAME BACK NORMAL.     NO ANSWER WHEN CALLING, LVM.

## 2022-02-14 ENCOUNTER — SPECIALTY PHARMACY (OUTPATIENT)
Dept: PHARMACY | Facility: HOSPITAL | Age: 52
End: 2022-02-14

## 2022-02-14 DIAGNOSIS — B18.2 CHRONIC HEPATITIS C WITHOUT HEPATIC COMA: Primary | ICD-10-CM

## 2022-02-16 ENCOUNTER — LAB (OUTPATIENT)
Dept: LAB | Facility: HOSPITAL | Age: 52
End: 2022-02-16

## 2022-02-16 DIAGNOSIS — B18.2 CHRONIC HEPATITIS C WITHOUT HEPATIC COMA: ICD-10-CM

## 2022-02-16 PROCEDURE — 36415 COLL VENOUS BLD VENIPUNCTURE: CPT

## 2022-02-16 PROCEDURE — 87522 HEPATITIS C REVRS TRNSCRPJ: CPT

## 2022-02-16 PROCEDURE — 80053 COMPREHEN METABOLIC PANEL: CPT

## 2022-02-17 LAB
ALBUMIN SERPL-MCNC: 4.8 G/DL (ref 3.5–5.2)
ALBUMIN/GLOB SERPL: 2 G/DL
ALP SERPL-CCNC: 110 U/L (ref 39–117)
ALT SERPL W P-5'-P-CCNC: 16 U/L (ref 1–41)
ANION GAP SERPL CALCULATED.3IONS-SCNC: 10 MMOL/L (ref 5–15)
AST SERPL-CCNC: 25 U/L (ref 1–40)
BILIRUB SERPL-MCNC: 0.3 MG/DL (ref 0–1.2)
BUN SERPL-MCNC: 11 MG/DL (ref 6–20)
BUN/CREAT SERPL: 14.7 (ref 7–25)
CALCIUM SPEC-SCNC: 9.2 MG/DL (ref 8.6–10.5)
CHLORIDE SERPL-SCNC: 101 MMOL/L (ref 98–107)
CO2 SERPL-SCNC: 26 MMOL/L (ref 22–29)
CREAT SERPL-MCNC: 0.75 MG/DL (ref 0.76–1.27)
GFR SERPL CREATININE-BSD FRML MDRD: 111 ML/MIN/1.73
GLOBULIN UR ELPH-MCNC: 2.4 GM/DL
GLUCOSE SERPL-MCNC: 69 MG/DL (ref 65–99)
POTASSIUM SERPL-SCNC: 4.2 MMOL/L (ref 3.5–5.2)
PROT SERPL-MCNC: 7.2 G/DL (ref 6–8.5)
SODIUM SERPL-SCNC: 137 MMOL/L (ref 136–145)

## 2022-02-18 LAB
HCV RNA SERPL NAA+PROBE-ACNC: NORMAL IU/ML
TEST INFORMATION: NORMAL

## 2022-03-22 ENCOUNTER — OFFICE VISIT (OUTPATIENT)
Dept: NEUROLOGY | Facility: CLINIC | Age: 52
End: 2022-03-22

## 2022-03-22 VITALS
BODY MASS INDEX: 28.85 KG/M2 | OXYGEN SATURATION: 97 % | SYSTOLIC BLOOD PRESSURE: 120 MMHG | TEMPERATURE: 97.1 F | WEIGHT: 213 LBS | HEIGHT: 72 IN | DIASTOLIC BLOOD PRESSURE: 64 MMHG | HEART RATE: 80 BPM

## 2022-03-22 DIAGNOSIS — R42 DIZZINESS OF UNKNOWN ETIOLOGY: ICD-10-CM

## 2022-03-22 DIAGNOSIS — G56.01 CARPAL TUNNEL SYNDROME OF RIGHT WRIST: ICD-10-CM

## 2022-03-22 DIAGNOSIS — G43.119 BASILAR ARTERY MIGRAINE, INTRACTABLE: Primary | ICD-10-CM

## 2022-03-22 PROCEDURE — 99213 OFFICE O/P EST LOW 20 MIN: CPT | Performed by: NURSE PRACTITIONER

## 2022-03-22 RX ORDER — FAMOTIDINE 20 MG/1
20 TABLET, FILM COATED ORAL DAILY
COMMUNITY
Start: 2022-03-08

## 2022-03-22 RX ORDER — RIZATRIPTAN BENZOATE 5 MG/1
5 TABLET ORAL ONCE AS NEEDED
Qty: 9 TABLET | Refills: 2 | Status: SHIPPED | OUTPATIENT
Start: 2022-03-22 | End: 2023-03-22

## 2022-03-22 RX ORDER — TIZANIDINE 4 MG/1
TABLET ORAL
COMMUNITY
Start: 2022-03-08

## 2022-03-22 RX ORDER — VENLAFAXINE HYDROCHLORIDE 75 MG/1
75 CAPSULE, EXTENDED RELEASE ORAL DAILY
Qty: 30 CAPSULE | Refills: 2 | Status: SHIPPED | OUTPATIENT
Start: 2022-03-22 | End: 2023-03-22

## 2022-03-22 RX ORDER — RIMEGEPANT SULFATE 75 MG/75MG
TABLET, ORALLY DISINTEGRATING ORAL
COMMUNITY
Start: 2022-01-12

## 2022-03-22 RX ORDER — OMEPRAZOLE 20 MG/1
20 CAPSULE, DELAYED RELEASE ORAL DAILY
COMMUNITY
Start: 2022-01-21

## 2022-03-22 NOTE — PROGRESS NOTES
Follow Up Neurology Office Visit      Patient Name: Dusty Erickson    Referring Physician: No ref. provider found    Chief Complaint:    Chief Complaint   Patient presents with   • Follow-up     Pt in office to follow up on migraines       History of Present Illness: Dusty Erickson is a 51 y.o. male who is here to follow up with Neurology for headaches  Imitrex unhelpful  No longer taking amitriptyline due to change in shift schedule, felt it was too sedating.    Unable to have carotid ultrasound as previously scheduled due working 3rd shift    Asking about treatment options for CTS    The following portions of the patient's history were reviewed and updated as appropriate: allergies, current medications, past family history, past medical history, past social history, past surgical history and problem list.    Subjective     Review of Systems:   Review of Systems   Musculoskeletal: Positive for arthralgias.   Neurological: Positive for numbness and headache.   All other systems reviewed and are negative.    Medications:     Current Outpatient Medications:   •  atorvastatin (LIPITOR) 40 MG tablet, Take 40 mg by mouth Daily., Disp: , Rfl:   •  buprenorphine-naloxone (SUBOXONE) 8-2 MG per SL tablet, Place 2 tablets under the tongue Daily., Disp: , Rfl:   •  famotidine (PEPCID) 20 MG tablet, Take 20 mg by mouth Daily., Disp: , Rfl:   •  gabapentin (NEURONTIN) 600 MG tablet, Take 600 mg by mouth 3 (Three) Times a Day., Disp: , Rfl:   •  ibuprofen (ADVIL,MOTRIN) 800 MG tablet, Take 800 mg by mouth Every 8 (Eight) Hours As Needed for Mild Pain ., Disp: , Rfl:   •  metoprolol succinate XL (TOPROL-XL) 25 MG 24 hr tablet, Take 12.5 mg by mouth Daily., Disp: , Rfl:   •  Nurtec 75 MG dispersible tablet, , Disp: , Rfl:   •  omeprazole (priLOSEC) 20 MG capsule, Take 20 mg by mouth Daily., Disp: , Rfl:   •  omeprazole (priLOSEC) 40 MG capsule, Take 40 mg by mouth Daily., Disp: , Rfl:   •  tiZANidine  "(ZANAFLEX) 4 MG tablet, Take ONE-HALF TO TWO tabs BY MOUTH TWICE DAILY AS NEEDED FOR PAIN, Disp: , Rfl:   •  rizatriptan (MAXALT) 5 MG tablet, Take 1 tablet by mouth 1 (One) Time As Needed for Migraine. May repeat in 2 hours if needed, Disp: 9 tablet, Rfl: 2  •  venlafaxine XR (Effexor XR) 75 MG 24 hr capsule, Take 1 capsule by mouth Daily., Disp: 30 capsule, Rfl: 2    Allergies:   No Known Allergies    Objective     Physical Exam:  Vital Signs:   Vitals:    03/22/22 1546   BP: 120/64   Pulse: 80   Temp: 97.1 °F (36.2 °C)   SpO2: 97%   Weight: 96.6 kg (213 lb)   Height: 182.9 cm (72\")   PainSc:   2   PainLoc: Hand       Physical Exam  Vitals and nursing note reviewed.   Constitutional:       Appearance: Normal appearance.   Pulmonary:      Effort: Pulmonary effort is normal.   Neurological:      Mental Status: He is oriented to person, place, and time. Mental status is at baseline.      Coordination: Romberg Test normal.      Gait: Gait is intact.      Deep Tendon Reflexes: Strength normal.   Psychiatric:         Mood and Affect: Mood normal.         Speech: Speech normal.         Behavior: Behavior normal.       Neurologic Exam     Mental Status   Oriented to person, place, and time.   Speech: speech is normal   Level of consciousness: alert  Normal comprehension.     Cranial Nerves   Cranial nerves II through XII intact.     Motor Exam   Muscle bulk: normal  Overall muscle tone: normal    Strength   Strength 5/5 throughout.     Sensory Exam   Right arm vibration: decreased from wrist  Left arm vibration: normal  Right leg vibration: normal  Left leg vibration: normal    Gait, Coordination, and Reflexes     Gait  Gait: normal    Coordination   Romberg: negative    Tremor   Resting tremor: absent    MRI BRAIN WO CONTRAST-   HISTORY: Headache, chronic, no new features; G43.119-Migraine with aura,  intractable, without status migrainosus; R29.90-Unspecified symptoms and  signs involving the nervous system   "   PROCEDURE: Multiplanar multisequence imaging of the brain was performed  without the use of intravenous contrast.     COMPARISON: None.     FINDINGS: There are no significant white matter abnormalities. There is  no mass, mass effect or midline shift. There is no hydrocephalus. There  are no areas of restricted diffusion.     The midbrain, dominick, cerebellum and craniocervical junction are  unremarkable. The sella and pituitary gland are within normal limits.  The major intracranial vasculature demonstrates the expected flow  related signal. The paranasal sinuses are clear.     IMPRESSION:  Unremarkable MRI of the brain.    Results Review:   I reviewed the patient's new clinical results.  I have reviewed the patient's other medical records to include, labs, radiology and referrals.   MRI brain imaging and report reviewed  EMG/NCV reports reviewed  Assessment / Plan      Assessment/Plan:   Diagnoses and all orders for this visit:    1. Basilar artery migraine, intractable (Primary)  -     venlafaxine XR (Effexor XR) 75 MG 24 hr capsule; Take 1 capsule by mouth Daily.  Dispense: 30 capsule; Refill: 2  -     rizatriptan (MAXALT) 5 MG tablet; Take 1 tablet by mouth 1 (One) Time As Needed for Migraine. May repeat in 2 hours if needed  Dispense: 9 tablet; Refill: 2    2. Dizziness of unknown etiology  -     US Carotid Bilateral; Future    3. Carpal tunnel syndrome of right wrist  -     Ambulatory Referral to Orthopedic Surgery       Follow Up:   Return in about 3 months (around 6/22/2022) for Next scheduled follow up.     LORETTA Garcia  [facility]   AS THE PROVIDER, I PERSONALLY WORE PPE DURING ENTIRE FACE TO FACE ENCOUNTER IN CLINIC WITH THE PATIENT. PATIENT ALSO WORE PPE DURING ENTIRE FACE TO FACE ENCOUNTER EXCEPT FOR A MAX OF 30 SECONDS DURING NEUROLOGICAL EVALUATION OF CRANIAL NERVES AND THEN MASK WAS PLACED BACK OVER PATIENT FACE FOR REMAINDER OF VISIT. I WASHED MY HANDS BEFORE AND  AFTER VISIT.      Please note that portions of this note may have been completed with a voice recognition program. Efforts were made to edit the dictations, but occasionally words are mistranscribed.

## 2022-03-28 ENCOUNTER — APPOINTMENT (OUTPATIENT)
Dept: ULTRASOUND IMAGING | Facility: HOSPITAL | Age: 52
End: 2022-03-28

## 2024-08-15 ENCOUNTER — SPECIALTY PHARMACY (OUTPATIENT)
Dept: PHARMACY | Facility: HOSPITAL | Age: 54
End: 2024-08-15
Payer: COMMERCIAL